# Patient Record
Sex: FEMALE | Race: WHITE | NOT HISPANIC OR LATINO | Employment: PART TIME | URBAN - METROPOLITAN AREA
[De-identification: names, ages, dates, MRNs, and addresses within clinical notes are randomized per-mention and may not be internally consistent; named-entity substitution may affect disease eponyms.]

---

## 2017-09-14 ENCOUNTER — OFFICE VISIT (OUTPATIENT)
Dept: FAMILY MEDICINE | Facility: CLINIC | Age: 42
End: 2017-09-14
Payer: COMMERCIAL

## 2017-09-14 VITALS
HEART RATE: 98 BPM | SYSTOLIC BLOOD PRESSURE: 117 MMHG | DIASTOLIC BLOOD PRESSURE: 68 MMHG | BODY MASS INDEX: 26.65 KG/M2 | OXYGEN SATURATION: 99 % | TEMPERATURE: 98.5 F | HEIGHT: 63 IN | WEIGHT: 150.4 LBS

## 2017-09-14 DIAGNOSIS — H66.002 ACUTE SUPPURATIVE OTITIS MEDIA OF LEFT EAR WITHOUT SPONTANEOUS RUPTURE OF TYMPANIC MEMBRANE, RECURRENCE NOT SPECIFIED: Primary | ICD-10-CM

## 2017-09-14 DIAGNOSIS — J32.9 VIRAL SINUSITIS: ICD-10-CM

## 2017-09-14 DIAGNOSIS — R19.7 DIARRHEA, UNSPECIFIED TYPE: ICD-10-CM

## 2017-09-14 DIAGNOSIS — B97.89 VIRAL SINUSITIS: ICD-10-CM

## 2017-09-14 PROCEDURE — 87506 IADNA-DNA/RNA PROBE TQ 6-11: CPT | Performed by: NURSE PRACTITIONER

## 2017-09-14 PROCEDURE — 87209 SMEAR COMPLEX STAIN: CPT | Performed by: NURSE PRACTITIONER

## 2017-09-14 PROCEDURE — 87177 OVA AND PARASITES SMEARS: CPT | Performed by: NURSE PRACTITIONER

## 2017-09-14 PROCEDURE — 87493 C DIFF AMPLIFIED PROBE: CPT | Mod: 59 | Performed by: NURSE PRACTITIONER

## 2017-09-14 PROCEDURE — 99214 OFFICE O/P EST MOD 30 MIN: CPT | Performed by: NURSE PRACTITIONER

## 2017-09-14 RX ORDER — FLUTICASONE PROPIONATE 50 MCG
1-2 SPRAY, SUSPENSION (ML) NASAL DAILY
Qty: 1 BOTTLE | Refills: 11 | Status: SHIPPED | OUTPATIENT
Start: 2017-09-14 | End: 2017-09-20

## 2017-09-14 RX ORDER — CEFDINIR 300 MG/1
300 CAPSULE ORAL 2 TIMES DAILY
Qty: 20 CAPSULE | Refills: 0 | Status: SHIPPED | OUTPATIENT
Start: 2017-09-14 | End: 2018-11-07

## 2017-09-14 NOTE — MR AVS SNAPSHOT
After Visit Summary   9/14/2017    Madeline Trent    MRN: [id number]           Patient Information     Date Of Birth          1975        Visit Information        Provider Department      9/14/2017 1:20 PM Merlyn Beckwith NP [facility]        Today's Diagnoses     Diarrhea, unspecified type    -  1    Acute suppurative otitis media of left ear without spontaneous rupture of tympanic membrane, recurrence not specified        Viral sinusitis          Care Instructions      Middle Ear Infection (Adult)  You have an infection of the middle ear, the space behind the eardrum. This is also called acute otitis media (AOM). Sometimes it is caused by the common cold. This is because congestion can block the internal passage (eustachian tube) that drains fluid from the middle ear. When the middle ear fills with fluid, bacteria can grow there and cause an infection. Oral antibiotics are used to treat this illness, not ear drops. Symptoms usually start to improve within 1 to 2 days of treatment.    Home care  The following are general care guidelines:    Finish all of the antibiotic medicine given, even though you may feel better after the first few days.    You may use over-the-counter medicine, such as acetaminophen or ibuprofen, to control pain and fever, unless something else was prescribed. If you have chronic liver or kidney disease or have ever had a stomach ulcer or gastrointestinal bleeding, talk with your healthcare provider before using these medicines. Do not give aspirin to anyone under 18 years of age who has a fever. It may cause severe illness or death.  Follow-up care  Follow up with your healthcare provider, or as advised, in 2 weeks if all symptoms have not gotten better, or if hearing doesn't go back to normal within 1 month.  When to seek medical advice  Call your healthcare provider right away if any of these occur:    Ear pain gets worse or does not improve after 3 days  of treatment    Unusual drowsiness or confusion    Neck pain, stiff neck, or headache    Fluid or blood draining from the ear canal    Fever of 100.4 F (38 C) or as advised     Seizure  Date Last Reviewed: 6/1/2016 2000-2017 The "[employer]. [address] [place] [address]. All rights reserved. This information is not intended as a substitute for professional medical care. Always follow your healthcare professional's instructions.                Follow-ups after your visit        Additional Services     GASTROENTEROLOGY ADULT REF PROCEDURE ONLY       Last Lab Result: Creatinine (mg/dL)       Date                     Value                 01/25/2013               0.75             ----------  Body mass index is 26.64 kg/(m^2).     Needed:  No  Language:  English    Patient will be contacted to schedule procedure.     Please be aware that coverage of these services is subject to the terms and limitations of your health insurance plan.  Call member services at your health plan with any benefit or coverage questions.  Any procedures must be performed at a [facility] facility OR coordinated by your clinic's referral office.    Please bring the following with you to your appointment:    (1) Any X-Rays, CTs or MRIs which have been performed.  Contact the facility where they were done to arrange for  prior to your scheduled appointment.    (2) List of current medications   (3) This referral request   (4) Any documents/labs given to you for this referral                  Follow-up notes from your care team     Return if symptoms worsen or fail to improve.      Future tests that were ordered for you today     Open Future Orders        Priority Expected Expires Ordered    Clostridium difficile Toxin B PCR Routine  10/14/2017 9/14/2017    Ova and Parasite Exam Routine Routine  9/14/2018 9/14/2017    Enteric Bacteria and Virus Panel by ANNETTE Stool Routine  9/14/2018 9/14/2017            Who to  "contact     Normal or non-critical lab and imaging results will be communicated to you by [facility], letter or phone within 4 business days after the clinic has received the results. If you do not hear from us within 7 days, please contact the clinic through THEMAt or phone. If you have a critical or abnormal lab result, we will notify you by phone as soon as possible.  Submit refill requests through [employer] or call your pharmacy and they will forward the refill request to us. Please allow 3 business days for your refill to be completed.          If you need to speak with a  for additional information , please call: [phone]             Additional Information About Your Visit        [employer] Information     [employer] gives you secure access to your electronic health record. If you see a primary care provider, you can also send messages to your care team and make appointments. If you have questions, please call your primary care clinic.  If you do not have a primary care provider, please call [phone] and they will assist you.        Care EveryWhere ID     This is your Care EveryWhere ID. This could be used by other organizations to access your [facility] medical records  [id number]        Your Vitals Were     Pulse Temperature Height Pulse Oximetry BMI (Body Mass Index)       98 98.5  F (36.9  C) (Oral) 5' 3\" (1.6 m) 99% 26.64 kg/m2        Blood Pressure from Last 3 Encounters:   09/14/17 117/68   04/28/16 110/73   01/07/16 119/57    Weight from Last 3 Encounters:   09/14/17 150 lb 6.4 oz (68.2 kg)   04/28/16 160 lb 3.2 oz (72.7 kg)   01/07/16 155 lb (70.3 kg)              We Performed the Following     Asthma Action Plan (AAP)     GASTROENTEROLOGY ADULT REF PROCEDURE ONLY          Today's Medication Changes          These changes are accurate as of: 9/14/17  1:36 PM.  If you have any questions, ask your nurse or doctor.               Start taking these medicines.        Dose/Directions    " cefdinir 300 MG capsule   Commonly known as:  OMNICEF   Used for:  Acute suppurative otitis media of left ear without spontaneous rupture of tympanic membrane, recurrence not specified   Started by:  Merlyn Beckwith NP        Dose:  300 mg   Take 1 capsule (300 mg) by mouth 2 times daily   Quantity:  20 capsule   Refills:  0       fluticasone 50 MCG/ACT spray   Commonly known as:  FLONASE   Used for:  Acute suppurative otitis media of left ear without spontaneous rupture of tympanic membrane, recurrence not specified, Viral sinusitis   Started by:  Merlyn Beckwith NP        Dose:  1-2 spray   Spray 1-2 sprays into both nostrils daily   Quantity:  1 Bottle   Refills:  11            Where to get your medicines      These medications were sent to [id number] - [place] - [address] SEC OF [address]  [address], [place] [address]    Hours:  Test fax successful 12/11/02  KR Phone:  [phone]     cefdinir 300 MG capsule    fluticasone 50 MCG/ACT spray                Primary Care Provider Office Phone # Fax #    González Mawuena Agbeh, [phone] [address]  [place] [address]        Equal Access to Services     [facility] : Hadii aad ku hadasho Soomaali, waaxda luqadaha, qaybta kaalmada adeegyada, waxay idiin hayaan adecassie brown . So [facility] [phone].    ATENCIÓN: Si habla español, tiene a murrieta disposición servicios gratuitos de asistencia lingüística. Llame al [phone].    We comply with applicable federal civil rights laws and [place] laws. We do not discriminate on the basis of race, color, national origin, age, disability sex, sexual orientation or gender identity.            Thank you!     Thank you for choosing [facility]  for your care. Our goal is always to provide you with excellent care. Hearing back from our patients is one way we can continue to improve our services. Please take a few  minutes to complete the written survey that you may receive in the mail after your visit with us. Thank you!             Your Updated Medication List - Protect others around you: Learn how to safely use, store and throw away your medicines at www.disposemymeds.org.          This list is accurate as of: 9/14/17  1:36 PM.  Always use your most recent med list.                   Brand Name Dispense Instructions for use Diagnosis    cefdinir 300 MG capsule    OMNICEF    20 capsule    Take 1 capsule (300 mg) by mouth 2 times daily    Acute suppurative otitis media of left ear without spontaneous rupture of tympanic membrane, recurrence not specified       fluticasone 50 MCG/ACT spray    FLONASE    1 Bottle    Spray 1-2 sprays into both nostrils daily    Acute suppurative otitis media of left ear without spontaneous rupture of tympanic membrane, recurrence not specified, Viral sinusitis

## 2017-09-14 NOTE — LETTER
My Asthma Action Plan  Name: Madeline Trent   YOB: 1975  Date: 9/14/2017   My doctor: Merlyn Beckwith NP   My clinic: [facility] [place]        My Control Medicine:   My Rescue Medicine:    My Asthma Severity:   Avoid your asthma triggers:                GREEN ZONE   Good Control    I feel good    No cough or wheeze    Can work, sleep and play without asthma symptoms       Take your asthma control medicine every day.     1. If exercise triggers your asthma, take your rescue medication    15 minutes before exercise or sports, and    During exercise if you have asthma symptoms  2. Spacer to use with inhaler: If you have a spacer, make sure to use it with your inhaler             YELLOW ZONE Getting Worse  I have ANY of these:    I do not feel good    Cough or wheeze    Chest feels tight    Wake up at night   1. Keep taking your Green Zone medications  2. Start taking your rescue medicine:    every 20 minutes for up to 1 hour. Then every 4 hours for 24-48 hours.  3. If you stay in the Yellow Zone for more than 12-24 hours, contact your doctor.  4. If you do not return to the Green Zone in 12-24 hours or you get worse, start taking your oral steroid medicine if prescribed by your provider.           RED ZONE Medical Alert - Get Help  I have ANY of these:    I feel awful    Medicine is not helping    Breathing getting harder    Trouble walking or talking    Nose opens wide to breathe       1. Take your rescue medicine NOW  2. If your provider has prescribed an oral steroid medicine, start taking it NOW  3. Call your doctor NOW  4. If you are still in the Red Zone after 20 minutes and you have not reached your doctor:    Take your rescue medicine again and    Call 911 or go to the emergency room right away    See your regular doctor within 2 weeks of an Emergency Room or Urgent Care visit for follow-up treatment.        Electronically signed by: Nancy Becerril, September 14, 2017    Annual  Reminders:  Meet with Asthma Educator,  Flu Shot in the Fall, consider Pneumonia Vaccination for patients with asthma (aged 19 and older).    Pharmacy: [facility] [address] [place] [address] [address]                    Asthma Triggers  How To Control Things That Make Your Asthma Worse    Triggers are things that make your asthma worse.  Look at the list below to help you find your triggers and what you can do about them.  You can help prevent asthma flare-ups by staying away from your triggers.      Trigger                                                          What you can do   Cigarette Smoke  Tobacco smoke can make asthma worse. Do not allow smoking in your home, car or around you.  Be sure no one smokes at a child s day care or school.  If you smoke, ask your health care provider for ways to help you quit.  Ask family members to quit too.  Ask your health care provider for a referral to Quit Plan to help you quit smoking, or call 9-047-365-PLAN.     Colds, Flu, Bronchitis  These are common triggers of asthma. Wash your hands often.  Don t touch your eyes, nose or mouth.  Get a flu shot every year.     Dust Mites  These are tiny bugs that live in cloth or carpet. They are too small to see. Wash sheets and blankets in hot water every week.   Encase pillows and mattress in dust mite proof covers.  Avoid having carpet if you can. If you have carpet, vacuum weekly.   Use a dust mask and HEPA vacuum.   Pollen and Outdoor Mold  Some people are allergic to trees, grass, or weed pollen, or molds. Try to keep your windows closed.  Limit time out doors when pollen count is high.   Ask you health care provider about taking medicine during allergy season.     Animal Dander  Some people are allergic to skin flakes, urine or saliva from pets with fur or feathers. Keep pets with fur or feathers out of your home.    If you can t keep the pet outdoors, then keep the pet out of your  bedroom.  Keep the bedroom door closed.  Keep pets off cloth furniture and away from stuffed toys.     Mice, Rats, and Cockroaches  Some people are allergic to the waste from these pests.   Cover food and garbage.  Clean up spills and food crumbs.  Store grease in the refrigerator.   Keep food out of the bedroom.   Indoor Mold  This can be a trigger if your home has high moisture. Fix leaking faucets, pipes, or other sources of water.   Clean moldy surfaces.  Dehumidify basement if it is damp and smelly.   Smoke, Strong Odors, and Sprays  These can reduce air quality. Stay away from strong odors and sprays, such as perfume, powder, hair spray, paints, smoke incense, paint, cleaning products, candles and new carpet.   Exercise or Sports  Some people with asthma have this trigger. Be active!  Ask your doctor about taking medicine before sports or exercise to prevent symptoms.    Warm up for 5-10 minutes before and after sports or exercise.     Other Triggers of Asthma  Cold air:  Cover your nose and mouth with a scarf.  Sometimes laughing or crying can be a trigger.  Some medicines and food can trigger asthma.

## 2017-09-14 NOTE — NURSING NOTE
"Chief Complaint   Patient presents with     Ent Problem     Diarrhea       Initial /68  Pulse 98  Temp 98.5  F (36.9  C) (Oral)  Ht 5' 3\" (1.6 m)  Wt 150 lb 6.4 oz (68.2 kg)  SpO2 99%  BMI 26.64 kg/m2 Estimated body mass index is 26.64 kg/(m^2) as calculated from the following:    Height as of this encounter: 5' 3\" (1.6 m).    Weight as of this encounter: 150 lb 6.4 oz (68.2 kg).  Medication Reconciliation: complete     Nancy Becerril MA  "

## 2017-09-14 NOTE — PROGRESS NOTES
SUBJECTIVE:  Madeline Trent  is a 42 year old  female  who presents with the following concerns;              Symptoms: Present Comment   Fever/Chills     Fatigue     Muscle Aches     Eye Irritation     Sneezing     Nasal Evelio/Drg x    Sinus Pressure/Pain x    Loss of smell     Dental pain     Sore Throat     Swollen Glands     Ear Pain/Fullness x Left    Cough     Wheeze     Chest Pain     Shortness of breath     Rash     Other x diarrhea     Symptom duration:  sinus-3rd day, diarrhea since may   Sympom severity:  mild   Treatments tried:  claritin   Contacts:  daughter had a cold       Medications updated and reviewed.  Past, family and surgical history is updated and reviewed in the record.  Patient Active Problem List    Diagnosis Date Noted     Mild persistent asthma 01/25/2013     Priority: High     ASCUS favor benign 11/30/2015     Priority: Medium     11/30/15: Pap - ASCUS, Neg HPV. Plan cotest in 3 years.        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     Genital herpes       Family History   Problem Relation Age of Onset     Lipids Father      Eye Disorder Maternal Grandfather      cataract     Lipids Maternal Grandfather      Respiratory Maternal Grandfather      CANCER Paternal Grandmother      pantriatic     Arthritis Paternal Grandmother      CANCER Paternal Grandfather      skin     Respiratory Paternal Grandfather      Arthritis Paternal Grandfather      Hypertension Brother      Alcohol/Drug Brother      addict to pain killers     CANCER Maternal Grandmother      ROS:  Other than noted above, general, HEENT, respiratory,  cardiac and gastrointestinal systems are negative.    OBJECTIVE:  GENERAL:  Alert, no acute distress  EYES:  PERRL, EOM normal, conjunctiva and lids normal  HEENT:  Ears and R TM normal, oral mucosa and posterior oropharynx normal  POSITIVE for L TM erythematous, bulging with loss of landmarks, nasal mucosa edematous, erythematous  RESP:  Lungs clear to auscultation.  CV:  Normal rate, regular rhythm, no murmur or gallop.  ABDOMEN:  Soft, no organomegaly, masses or tenderness  LYMPHATICS:  No cervical, supraclavicular adenopathy  NEURO:  Alert, oriented, speech and mentation normal      Assessment/Plan:     ICD-10-CM    1. Acute suppurative otitis media of left ear without spontaneous rupture of tympanic membrane, recurrence not specified H66.002 cefdinir (OMNICEF) 300 MG capsule     fluticasone (FLONASE) 50 MCG/ACT spray   2. Diarrhea, unspecified type R19.7 Clostridium difficile Toxin B PCR     Ova and Parasite Exam Routine     Enteric Bacteria and Virus Panel by ANNETTE Stool     GASTROENTEROLOGY ADULT REF PROCEDURE ONLY    x4 months   3. Viral sinusitis J32.9 fluticasone (FLONASE) 50 MCG/ACT spray    B97.89         See patient instructions    Merlyn Beckwith, MICHELL, FNP-BC

## 2017-09-14 NOTE — PATIENT INSTRUCTIONS
Middle Ear Infection (Adult)  You have an infection of the middle ear, the space behind the eardrum. This is also called acute otitis media (AOM). Sometimes it is caused by the common cold. This is because congestion can block the internal passage (eustachian tube) that drains fluid from the middle ear. When the middle ear fills with fluid, bacteria can grow there and cause an infection. Oral antibiotics are used to treat this illness, not ear drops. Symptoms usually start to improve within 1 to 2 days of treatment.    Home care  The following are general care guidelines:    Finish all of the antibiotic medicine given, even though you may feel better after the first few days.    You may use over-the-counter medicine, such as acetaminophen or ibuprofen, to control pain and fever, unless something else was prescribed. If you have chronic liver or kidney disease or have ever had a stomach ulcer or gastrointestinal bleeding, talk with your healthcare provider before using these medicines. Do not give aspirin to anyone under 18 years of age who has a fever. It may cause severe illness or death.  Follow-up care  Follow up with your healthcare provider, or as advised, in 2 weeks if all symptoms have not gotten better, or if hearing doesn't go back to normal within 1 month.  When to seek medical advice  Call your healthcare provider right away if any of these occur:    Ear pain gets worse or does not improve after 3 days of treatment    Unusual drowsiness or confusion    Neck pain, stiff neck, or headache    Fluid or blood draining from the ear canal    Fever of 100.4 F (38 C) or as advised     Seizure  Date Last Reviewed: 6/1/2016 2000-2017 [employer]. [address] [place] [address], [place] [address]. All rights reserved. This information is not intended as a substitute for professional medical care. Always follow your healthcare professional's instructions.

## 2017-09-15 ENCOUNTER — HOSPITAL ENCOUNTER (OUTPATIENT)
Facility: AMBULATORY SURGERY CENTER | Age: 42
End: 2017-09-15
Attending: SURGERY | Admitting: SURGERY
Payer: COMMERCIAL

## 2017-09-15 LAB
C COLI+JEJUNI+LARI FUSA STL QL NAA+PROBE: NOT DETECTED
C DIFF TOX B STL QL: NEGATIVE
EC STX1 GENE STL QL NAA+PROBE: NOT DETECTED
EC STX2 GENE STL QL NAA+PROBE: NOT DETECTED
ENTERIC PATHOGEN COMMENT: NORMAL
NOROV GI+II ORF1-ORF2 JNC STL QL NAA+PR: NOT DETECTED
O+P STL MICRO: NORMAL
O+P STL MICRO: NORMAL
RVA NSP5 STL QL NAA+PROBE: NOT DETECTED
SALMONELLA SP RPOD STL QL NAA+PROBE: NOT DETECTED
SHIGELLA SP+EIEC IPAH STL QL NAA+PROBE: NOT DETECTED
SPECIMEN SOURCE: NORMAL
SPECIMEN SOURCE: NORMAL
V CHOL+PARA RFBL+TRKH+TNAA STL QL NAA+PR: NOT DETECTED
Y ENTERO RECN STL QL NAA+PROBE: NOT DETECTED

## 2017-09-15 ASSESSMENT — ASTHMA QUESTIONNAIRES: ACT_TOTALSCORE: 24

## 2017-09-15 NOTE — PROGRESS NOTES
Julio Cesar Correa,    Thank you for your recent office visit.    Here are your recent results.  Normal stool tests. It is possible that you have IBS or another intestinal condition.  Please schedule your colonoscopy, I will let you know the results of that. Follow up if your symptoms worsen.     Feel free to contact me via [email or web address] or call the clinic at [phone].    Sincerely,    MICHELL Eaton, FNP-BC

## 2017-10-11 ENCOUNTER — HOSPITAL ENCOUNTER (OUTPATIENT)
Facility: AMBULATORY SURGERY CENTER | Age: 42
End: 2017-10-11
Attending: SURGERY | Admitting: SURGERY
Payer: COMMERCIAL

## 2017-10-30 RX ORDER — LIDOCAINE 40 MG/G
CREAM TOPICAL
Status: CANCELLED | OUTPATIENT
Start: 2017-10-30

## 2018-11-07 ENCOUNTER — OFFICE VISIT (OUTPATIENT)
Dept: OBGYN | Facility: CLINIC | Age: 43
End: 2018-11-07
Payer: COMMERCIAL

## 2018-11-07 ENCOUNTER — RESULT FOLLOW UP (OUTPATIENT)
Dept: OBGYN | Facility: CLINIC | Age: 43
End: 2018-11-07

## 2018-11-07 ENCOUNTER — TELEPHONE (OUTPATIENT)
Dept: FAMILY MEDICINE | Facility: CLINIC | Age: 43
End: 2018-11-07

## 2018-11-07 VITALS
HEART RATE: 78 BPM | DIASTOLIC BLOOD PRESSURE: 75 MMHG | OXYGEN SATURATION: 98 % | BODY MASS INDEX: 27.07 KG/M2 | WEIGHT: 152.8 LBS | HEIGHT: 63 IN | SYSTOLIC BLOOD PRESSURE: 119 MMHG | RESPIRATION RATE: 18 BRPM

## 2018-11-07 DIAGNOSIS — R19.7 DIARRHEA, UNSPECIFIED TYPE: Primary | ICD-10-CM

## 2018-11-07 DIAGNOSIS — Z12.31 ENCOUNTER FOR SCREENING MAMMOGRAM FOR BREAST CANCER: ICD-10-CM

## 2018-11-07 DIAGNOSIS — Z13.1 SCREENING FOR DIABETES MELLITUS: ICD-10-CM

## 2018-11-07 DIAGNOSIS — Z01.419 ENCOUNTER FOR GYNECOLOGICAL EXAMINATION WITHOUT ABNORMAL FINDING: Primary | ICD-10-CM

## 2018-11-07 DIAGNOSIS — Z13.220 SCREENING CHOLESTEROL LEVEL: ICD-10-CM

## 2018-11-07 DIAGNOSIS — R87.810 CERVICAL HIGH RISK HPV (HUMAN PAPILLOMAVIRUS) TEST POSITIVE: ICD-10-CM

## 2018-11-07 DIAGNOSIS — Z12.4 PAP SMEAR FOR CERVICAL CANCER SCREENING: ICD-10-CM

## 2018-11-07 PROCEDURE — 88175 CYTOPATH C/V AUTO FLUID REDO: CPT | Performed by: OBSTETRICS & GYNECOLOGY

## 2018-11-07 PROCEDURE — 87624 HPV HI-RISK TYP POOLED RSLT: CPT | Performed by: OBSTETRICS & GYNECOLOGY

## 2018-11-07 PROCEDURE — 99396 PREV VISIT EST AGE 40-64: CPT | Performed by: OBSTETRICS & GYNECOLOGY

## 2018-11-07 RX ORDER — ALBUTEROL SULFATE 90 UG/1
2 AEROSOL, METERED RESPIRATORY (INHALATION) EVERY 6 HOURS
COMMUNITY

## 2018-11-07 NOTE — PROGRESS NOTES
Madeline Trent is a 43 year old female , who presents for annual exam.   No unusual bleeding, no incontinence, or unusual discharge.   She is currently using the ParaGard IUD for contraception.  She does not have any apparent contraindications to use.  She has not had any apparent complications with it's use.  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  no evidence of malignancy.     PE TUBES         Obstetric History       T2      L2     SAB0   TAB0   Ectopic0   Multiple0   Live Births2       # Outcome Date GA Lbr Tyron/2nd Weight Sex Delivery Anes PTL Lv   2 Term 10/10/15   8 lb 7 oz (3.827 kg) F Vag-Spont   JEM      Name: Korin Purvis   1 Term 13 40w2d  7 lb 8 oz (3.402 kg) F    JEM      Name: Essence          Gyn History:  Gynecological History         No LMP recorded. Patient is not currently having periods (Reason: IUD).     no STD/no PID/ParaGard IUD      history of abnormal pap smear:  Yes   Last pap:   Lab Results   Component Value Date    PAP ASC-US 2015           Current Outpatient Prescriptions   Medication Sig Dispense Refill     albuterol (PROAIR HFA/PROVENTIL HFA/VENTOLIN HFA) 108 (90 Base) MCG/ACT inhaler Inhale 2 puffs into the lungs every 6 hours         Allergies   Allergen Reactions     Sulfa Drugs        Social History     Social History     Marital status: Significant other     Spouse name: partner- Sarabjit     Number of children: 1  "    Years of education: N/A     Occupational History     construction work  [employer]     Social History Main Topics     Smoking status: Never Smoker     Smokeless tobacco: Never Used     Alcohol use 0.0 oz/week     0 Standard drinks or equivalent per week      Comment: occasionally, not in PG     Drug use: No     Sexual activity: Yes     Partners: Male     Other Topics Concern      Service No     Blood Transfusions No     Caffeine Concern No     Occupational Exposure No     Hobby Hazards No     Sleep Concern No     Stress Concern No     Weight Concern No     Special Diet No     Back Care Yes     Exercise Yes     moderate, at work     Bike Helmet Not Asked     she does not ride a bike     Seat Belt Yes     Self-Exams Yes     Social History Narrative       Family History   Problem Relation Age of Onset     Lipids Father      Eye Disorder Maternal Grandfather      cataract     Lipids Maternal Grandfather      Respiratory Maternal Grandfather      Cancer Paternal Grandmother      pantriatic     Arthritis Paternal Grandmother      Cancer Paternal Grandfather      skin     Respiratory Paternal Grandfather      Arthritis Paternal Grandfather      Hypertension Brother      Alcohol/Drug Brother      addict to pain killers     Cancer Maternal Grandmother          ROS:  All negative except as above.      EXAM:  /75 (BP Location: Right arm, Cuff Size: Adult Regular)  Pulse 78  Resp 18  Ht 5' 2.5\" (1.588 m)  Wt 152 lb 12.8 oz (69.3 kg)  SpO2 98%  Breastfeeding? No  BMI 27.5 kg/m2  General:  WNWD female, NAD  Alert  Oriented x 3  Gait:  Normal  Skin:  Normal skin turgor  Neurologic:  CN grossly intact, good sensation.    HEENT:  NC/AT, EOMI  Neck:  No masses palpated, symmetrical, carotids +2/4, no bruits heard  Heart:  RRR  Lungs:  Clear   Breasts:  Symmetrical, no dimpling noted, no masses palpated, no discharge expressed  Abdomen:  Non-tender, non-distended.  Vulva: No external lesions, " normal hair distribution, no adenopathy  BUS:  Normal, no masses noted  Urethra:  No hypermobility noted  Urethral meatus:  No masses noted  Vagina: Moist, pink, no abnormal discharge, well rugated, no lesions  Cervix: Smooth, pink, no visible lesions  Uterus: Normal size, anteverted, non-tender, mobile  Ovaries: No mass, non-tender, mobile  Perianal:  No masses noted.    Extremities:  No clubbing, cyanosis, or edema      ASSESSMENT/PLAN   Annual examination with pap smear screen  Schedule for the mammogram   Low fat diet, weight bearing exercises and self breast exams on a monthly basis have been recommended.  I have discussed with patient the risks, benefits, medications, treatment options and modalities.   I have instructed the patient to call or schedule a follow-up appointment if any problems.

## 2018-11-07 NOTE — LETTER
October 24, 2019      Madeline Trent  [address] [place] [address]    Dear ,      At [place], your health and wellness is our primary concern. That is why we are following up on a positive high risk HPV test from 11/7/18. Your provider had recommended that you have a Pap smear and HPV test completed by 11/7/19. Our records do not show that this has been scheduled.    It is important to complete the follow up that your provider has suggested for you to ensure that there are no worsening changes which may, over time, develop into cancer.      Please contact our office at  [phone] to schedule an appointment for a Pap smear and HPV test at your earliest convenience. If you have questions or concerns, please call the clinic and we will be happy to assist you.    If you have completed the tests outside of [place], please have the results forwarded to our office. We will update the chart for your primary Physician to review before your next annual physical.     Thank you for choosing [facility]!    Sincerely,      Your [facility] Care Team/arun

## 2018-11-07 NOTE — TELEPHONE ENCOUNTER
Patient is calling to schedule a colonoscopy.  Please call patient to get this scheduled.  Thank you

## 2018-11-07 NOTE — MR AVS SNAPSHOT
After Visit Summary   11/7/2018    Madeline Trent    MRN: [id number]           Patient Information     Date Of Birth          1975        Visit Information        Provider Department      11/7/2018 2:00 PM Lion Cuevas MD [facility]        Today's Diagnoses     Encounter for gynecological examination without abnormal finding    -  1    Pap smear for cervical cancer screening        Encounter for screening mammogram for breast cancer        Screening cholesterol level        Screening for diabetes mellitus           Follow-ups after your visit        Follow-up notes from your care team     Return in about 1 year (around 11/7/2019) for Physical Exam.      Future tests that were ordered for you today     Open Future Orders        Priority Expected Expires Ordered    Lipid panel reflex to direct LDL Fasting Routine  11/7/2019 11/7/2018    Glucose Routine  5/6/2019 11/7/2018    *MA Screening Digital Bilateral Routine  11/7/2019 11/7/2018            Who to contact     If you have questions or need follow up information about today's clinic visit or your schedule please contact [facility] directly at [phone].  Normal or non-critical lab and imaging results will be communicated to you by MyChart, letter or phone within 4 business days after the clinic has received the results. If you do not hear from us within 7 days, please contact the clinic through [employer] or phone. If you have a critical or abnormal lab result, we will notify you by phone as soon as possible.  Submit refill requests through [employer] or call your pharmacy and they will forward the refill request to us. Please allow 3 business days for your refill to be completed.          Additional Information About Your Visit        MyChart Information     [employer] gives you secure access to your electronic health record. If you see a primary care provider, you can also send messages to your care team and  "make appointments. If you have questions, please call your primary care clinic.  If you do not have a primary care provider, please call [phone] and they will assist you.        Care EveryWhere ID     This is your Care EveryWhere ID. This could be used by other organizations to access your [place] medical records  [id number]        Your Vitals Were     Pulse Respirations Height Pulse Oximetry Breastfeeding? BMI (Body Mass Index)    78 18 5' 2.5\" (1.588 m) 98% No 27.5 kg/m2       Blood Pressure from Last 3 Encounters:   11/07/18 119/75   09/14/17 117/68   04/28/16 110/73    Weight from Last 3 Encounters:   11/07/18 152 lb 12.8 oz (69.3 kg)   09/14/17 150 lb 6.4 oz (68.2 kg)   04/28/16 160 lb 3.2 oz (72.7 kg)              We Performed the Following     HPV High Risk Types DNA Cervical     Pap imaged thin layer screen with HPV - recommended age 30 - 65 years (select HPV order below)        Primary Care Provider Office Phone # Fax #    González Mawuena Agbeh, [phone] [address] [place] [address]        Equal Access to Services     CHILANGO VALVERDE : Hadii aad ku hadasho Soomaali, waaxda luqadaha, qaybta kaalmada adeegyada, waxay zoie townsend. So [facility] [phone].    ATENCIÓN: Si habla español, tiene a murrieta disposición servicios gratuitos de asistencia lingüística. Llame al [phone].    We comply with applicable federal civil rights laws and [place] laws. We do not discriminate on the basis of race, color, national origin, age, disability, sex, sexual orientation, or gender identity.            Thank you!     Thank you for choosing [facility]  for your care. Our goal is always to provide you with excellent care. Hearing back from our patients is one way we can continue to improve our services. Please take a few minutes to complete the written survey that you may receive in the mail after your visit with us. Thank you!             Your " Updated Medication List - Protect others around you: Learn how to safely use, store and throw away your medicines at www.disposemymeds.org.          This list is accurate as of 11/7/18  3:05 PM.  Always use your most recent med list.                   Brand Name Dispense Instructions for use Diagnosis    albuterol 108 (90 Base) MCG/ACT inhaler    PROAIR HFA/PROVENTIL HFA/VENTOLIN HFA     Inhale 2 puffs into the lungs every 6 hours           Statement Selected

## 2018-11-08 NOTE — TELEPHONE ENCOUNTER
Routed to primary. MG has not seen patient in over a year and visit was for ent symptoms. Nancy Becerril MA

## 2018-11-12 ENCOUNTER — HOSPITAL ENCOUNTER (OUTPATIENT)
Facility: AMBULATORY SURGERY CENTER | Age: 43
End: 2018-11-12
Attending: SURGERY | Admitting: SURGERY
Payer: COMMERCIAL

## 2018-11-12 LAB
COPATH REPORT: NORMAL
PAP: NORMAL

## 2018-11-14 LAB
FINAL DIAGNOSIS: ABNORMAL
HPV HR 12 DNA CVX QL NAA+PROBE: POSITIVE
HPV16 DNA SPEC QL NAA+PROBE: NEGATIVE
HPV18 DNA SPEC QL NAA+PROBE: NEGATIVE
SPECIMEN DESCRIPTION: ABNORMAL
SPECIMEN SOURCE CVX/VAG CYTO: ABNORMAL

## 2018-11-14 NOTE — PROGRESS NOTES
11/30/15: Pap - ASCUS, Neg HPV. Plan cotest in 3 years.   11/7/18 NIL Pap, + HR HPV (Neg 16/18). Plan cotest in 1 year.   11/14/18 Result released to [employer]. Pt viewed result on [employer].   10/24/19 Cotest reminder letter sent (rl)  11/12/19 NIL Pap, + HR HPV (Neg 16/18). Plan colp (shasha)  11/19/19 Pt notified. (shasha)  12/6/19 colpo bx neg, ECC neg, polyp neg. Cotest in 1 year (e)

## 2018-11-27 RX ORDER — LIDOCAINE 40 MG/G
CREAM TOPICAL
Status: CANCELLED | OUTPATIENT
Start: 2018-11-27

## 2019-02-21 ENCOUNTER — ANESTHESIA EVENT (OUTPATIENT)
Dept: GASTROENTEROLOGY | Facility: CLINIC | Age: 44
End: 2019-02-21
Payer: COMMERCIAL

## 2019-02-22 NOTE — ANESTHESIA PREPROCEDURE EVALUATION
Anesthesia Pre-Procedure Evaluation    Patient: Madeline Trent   MRN: [id number] : 1975          Preoperative Diagnosis: diarrhea   diagnostic    Procedure(s):  COLONOSCOPY    Past Medical History:   Diagnosis Date     Abnormal Pap smear of cervix 2015    see problem list     Asthma      Benign tumor of fallopian tube and uterine ligaments      Cervical high risk HPV (human papillomavirus) test positive 2018    see problem list     Genital herpes      Past Surgical History:   Procedure Laterality Date     ADENOIDECTOMY       COLPOSCOPY, BIOPSY, COMBINED       HC EXCIS PRIMARY GANGLION WRIST       HC REMOVAL OF OVARIAN CYST(S)      right adnexal cyst:  benign focally papillary serous cystadenoma.  no evidence of malignancy.     PE TUBES         Anesthesia Evaluation     . Pt has had prior anesthetic. Type: General and MAC    No history of anesthetic complications          ROS/MED HX    ENT/Pulmonary:     (+)Mild Persistent asthma Treatment: Inhaler daily and Inhaler prn,  , . .    Neurologic:  - neg neurologic ROS     Cardiovascular:     (+) Dyslipidemia, ----. : . . . :. .       METS/Exercise Tolerance:  >4 METS   Hematologic:  - neg hematologic  ROS       Musculoskeletal:  - neg musculoskeletal ROS       GI/Hepatic:  - neg GI/hepatic ROS       Renal/Genitourinary:  - ROS Renal section negative       Endo:  - neg endo ROS       Psychiatric:  - neg psychiatric ROS       Infectious Disease:  - neg infectious disease ROS       Malignancy:      - no malignancy   Other: Comment: Abnormal pap  Genital herpes  High risk HPV   (+) No chance of pregnancy                         Physical Exam  Normal systems: cardiovascular, pulmonary and dental    Airway   Mallampati: II  TM distance: >3 FB  Neck ROM: full    Dental     Cardiovascular   Rhythm and rate: regular and normal      Pulmonary    breath sounds clear to auscultation            Lab Results   Component Value Date    WBC 7.8 2015     "HGB 12.0 09/02/2015    HCT 36.9 03/31/2015     03/31/2015    CRP <5.0 02/08/2013    SED 7 02/08/2013     01/25/2013    POTASSIUM 4.5 01/25/2013    CHLORIDE 101 01/25/2013    CO2 25 01/25/2013    BUN 14 01/25/2013    CR 0.75 01/25/2013    GLC 80 04/29/2016    NYA 9.5 01/25/2013    ALBUMIN 4.0 01/25/2013    PROTTOTAL 6.9 01/25/2013    ALT 27 01/25/2013    AST 27 01/25/2013    ALKPHOS 65 01/25/2013    BILITOTAL 0.5 01/25/2013    TSH 2.01 01/28/2013    HCG Positive (A) 04/04/2013       Preop Vitals  BP Readings from Last 3 Encounters:   11/07/18 119/75   09/14/17 117/68   04/28/16 110/73    Pulse Readings from Last 3 Encounters:   11/07/18 78   09/14/17 98   04/28/16 90      Resp Readings from Last 3 Encounters:   11/07/18 18   11/30/15 16   07/19/13 16    SpO2 Readings from Last 3 Encounters:   11/07/18 98%   09/14/17 99%   11/30/15 96%      Temp Readings from Last 1 Encounters:   09/14/17 36.9  C (98.5  F) (Oral)    Ht Readings from Last 1 Encounters:   11/07/18 1.588 m (5' 2.5\")      Wt Readings from Last 1 Encounters:   11/07/18 69.3 kg (152 lb 12.8 oz)    Estimated body mass index is 27.5 kg/m  as calculated from the following:    Height as of 11/7/18: 1.588 m (5' 2.5\").    Weight as of 11/7/18: 69.3 kg (152 lb 12.8 oz).       Anesthesia Plan      History & Physical Review  History and physical reviewed and following examination; no interval change.    ASA Status:  2 .        Plan for General with Propofol induction. Maintenance will be TIVA.           Postoperative Care      Consents  Anesthetic plan, risks, benefits and alternatives discussed with:  Patient..                 MICHELL Beltran CRNA  "

## 2019-02-27 ENCOUNTER — HOSPITAL ENCOUNTER (OUTPATIENT)
Facility: CLINIC | Age: 44
Discharge: HOME | End: 2019-02-27
Attending: SURGERY | Admitting: SURGERY
Payer: COMMERCIAL

## 2019-02-27 ENCOUNTER — ANESTHESIA (OUTPATIENT)
Dept: GASTROENTEROLOGY | Facility: CLINIC | Age: 44
End: 2019-02-27
Payer: COMMERCIAL

## 2019-02-27 VITALS
HEIGHT: 64 IN | HEART RATE: 65 BPM | SYSTOLIC BLOOD PRESSURE: 111 MMHG | DIASTOLIC BLOOD PRESSURE: 77 MMHG | OXYGEN SATURATION: 100 % | RESPIRATION RATE: 14 BRPM | TEMPERATURE: 97.7 F | WEIGHT: 145 LBS | BODY MASS INDEX: 24.75 KG/M2

## 2019-02-27 LAB
COLONOSCOPY: NORMAL
HCG UR QL: NEGATIVE

## 2019-02-27 PROCEDURE — 45380 COLONOSCOPY AND BIOPSY: CPT | Performed by: SURGERY

## 2019-02-27 PROCEDURE — 25000125 ZZHC RX 250: Performed by: SURGERY

## 2019-02-27 PROCEDURE — 81025 URINE PREGNANCY TEST: CPT | Performed by: NURSE ANESTHETIST, CERTIFIED REGISTERED

## 2019-02-27 PROCEDURE — 37000008 ZZH ANESTHESIA TECHNICAL FEE, 1ST 30 MIN: Performed by: SURGERY

## 2019-02-27 PROCEDURE — 25800030 ZZH RX IP 258 OP 636: Performed by: SURGERY

## 2019-02-27 PROCEDURE — 25000128 H RX IP 250 OP 636: Performed by: NURSE ANESTHETIST, CERTIFIED REGISTERED

## 2019-02-27 PROCEDURE — 25000125 ZZHC RX 250: Performed by: NURSE ANESTHETIST, CERTIFIED REGISTERED

## 2019-02-27 PROCEDURE — 88305 TISSUE EXAM BY PATHOLOGIST: CPT | Performed by: SURGERY

## 2019-02-27 PROCEDURE — 88305 TISSUE EXAM BY PATHOLOGIST: CPT | Mod: 26 | Performed by: SURGERY

## 2019-02-27 RX ORDER — ONDANSETRON 2 MG/ML
4 INJECTION INTRAMUSCULAR; INTRAVENOUS
Status: DISCONTINUED | OUTPATIENT
Start: 2019-02-27 | End: 2019-02-27 | Stop reason: HOSPADM

## 2019-02-27 RX ORDER — LIDOCAINE 40 MG/G
CREAM TOPICAL
Status: DISCONTINUED | OUTPATIENT
Start: 2019-02-27 | End: 2019-02-27 | Stop reason: HOSPADM

## 2019-02-27 RX ORDER — SODIUM CHLORIDE, SODIUM LACTATE, POTASSIUM CHLORIDE, CALCIUM CHLORIDE 600; 310; 30; 20 MG/100ML; MG/100ML; MG/100ML; MG/100ML
INJECTION, SOLUTION INTRAVENOUS CONTINUOUS
Status: DISCONTINUED | OUTPATIENT
Start: 2019-02-27 | End: 2019-02-27 | Stop reason: HOSPADM

## 2019-02-27 RX ORDER — NALOXONE HYDROCHLORIDE 0.4 MG/ML
.1-.4 INJECTION, SOLUTION INTRAMUSCULAR; INTRAVENOUS; SUBCUTANEOUS
Status: DISCONTINUED | OUTPATIENT
Start: 2019-02-27 | End: 2019-02-27 | Stop reason: HOSPADM

## 2019-02-27 RX ORDER — PROPOFOL 10 MG/ML
INJECTION, EMULSION INTRAVENOUS PRN
Status: DISCONTINUED | OUTPATIENT
Start: 2019-02-27 | End: 2019-02-27

## 2019-02-27 RX ORDER — FLUMAZENIL 0.1 MG/ML
0.2 INJECTION, SOLUTION INTRAVENOUS
Status: DISCONTINUED | OUTPATIENT
Start: 2019-02-27 | End: 2019-02-27 | Stop reason: HOSPADM

## 2019-02-27 RX ORDER — PROPOFOL 10 MG/ML
INJECTION, EMULSION INTRAVENOUS CONTINUOUS PRN
Status: DISCONTINUED | OUTPATIENT
Start: 2019-02-27 | End: 2019-02-27

## 2019-02-27 RX ADMIN — PROPOFOL 200 MCG/KG/MIN: 10 INJECTION, EMULSION INTRAVENOUS at 10:24

## 2019-02-27 RX ADMIN — LIDOCAINE HYDROCHLORIDE 40 MG: 10 INJECTION, SOLUTION EPIDURAL; INFILTRATION; INTRACAUDAL; PERINEURAL at 10:24

## 2019-02-27 RX ADMIN — LIDOCAINE HYDROCHLORIDE 0.1 ML: 10 INJECTION, SOLUTION EPIDURAL; INFILTRATION; INTRACAUDAL; PERINEURAL at 10:14

## 2019-02-27 RX ADMIN — PROPOFOL 100 MG: 10 INJECTION, EMULSION INTRAVENOUS at 10:24

## 2019-02-27 RX ADMIN — SODIUM CHLORIDE, POTASSIUM CHLORIDE, SODIUM LACTATE AND CALCIUM CHLORIDE: 600; 310; 30; 20 INJECTION, SOLUTION INTRAVENOUS at 10:14

## 2019-02-27 ASSESSMENT — MIFFLIN-ST. JEOR: SCORE: 1302.72

## 2019-02-27 NOTE — H&P
44 year old year old female here for colonoscopy for abdominal pain and chronic diarrhea.  Patient has had ongoing GI symptoms for years.  Describes spastic rectal pain which is random in frequency and duration.  She also admits chronic diarrhea, she was told she has IBS long ago, these symptoms have improved.  Denies blood in her stool.  There is a family history of polyps in her father and questionable CRC in her great grandmother.    Patient Active Problem List   Diagnosis     CARDIOVASCULAR SCREENING; LDL GOAL LESS THAN 160     Mild persistent asthma     RhD negative     Positive test for herpes simplex virus (HSV) antibody     Cervical high risk HPV (human papillomavirus) test positive       Past Medical History:   Diagnosis Date     Abnormal Pap smear of cervix 11/2015    see problem list     Asthma      Benign tumor of fallopian tube and uterine ligaments 2009     Cervical high risk HPV (human papillomavirus) test positive 11/07/2018    see problem list     Genital herpes        Past Surgical History:   Procedure Laterality Date     ADENOIDECTOMY       COLPOSCOPY, BIOPSY, COMBINED       HC EXCIS PRIMARY GANGLION WRIST       HC REMOVAL OF OVARIAN CYST(S)  2009    right adnexal cyst:  benign focally papillary serous cystadenoma.  no evidence of malignancy.     PE TUBES         Family History   Problem Relation Age of Onset     Lipids Father      Eye Disorder Maternal Grandfather         cataract     Lipids Maternal Grandfather      Respiratory Maternal Grandfather      Cancer Paternal Grandmother         pantriatic     Arthritis Paternal Grandmother      Cancer Paternal Grandfather         skin     Respiratory Paternal Grandfather      Arthritis Paternal Grandfather      Hypertension Brother      Alcohol/Drug Brother         addict to pain killers     Cancer Maternal Grandmother      No current outpatient medications on file.       Allergies   Allergen Reactions     Sulfa Drugs        Pt reports that  has never  "smoked. she has never used smokeless tobacco. She reports that she drinks alcohol. She reports that she does not use drugs.    Exam:  /74   Temp 97.7  F (36.5  C) (Oral)   Resp 16   Ht 1.626 m (5' 4\")   Wt 65.8 kg (145 lb)   SpO2 100%   Breastfeeding? No   BMI 24.89 kg/m      Awake, Alert OX3  Lungs - CTA bilaterally  CV - RRR, no murmurs, distal pulses intact  Abd - soft, non-distended, non-tender, +BS  Extr - No cyanosis or edema    A/P 44 year old year old female in need of colonoscopy for abdominal pain and diarrhea. Risks, benefits, alternatives, and complications were discussed including the possibility of perforation and the patient agreed to proceed.    Narinder Parker, DO on 2/27/2019 at 10:21 AM      "

## 2019-02-27 NOTE — ANESTHESIA POSTPROCEDURE EVALUATION
Patient: Madeline Trent    Procedure(s):  COMBINED COLONOSCOPY, SINGLE OR MULTIPLE BIOPSY/POLYPECTOMY BY BIOPSY    Diagnosis:diarrhea   diagnostic  Diagnosis Additional Information: No value filed.    Anesthesia Type:  General    Note:  Anesthesia Post Evaluation    Patient location during evaluation: Bedside and Phase 2  Patient participation: Able to fully participate in evaluation  Level of consciousness: awake and alert  Pain management: adequate  Airway patency: patent  Cardiovascular status: acceptable  Respiratory status: acceptable  Hydration status: acceptable  PONV: none     Anesthetic complications: None          Last vitals:  Vitals:    02/27/19 0942   BP: 118/74   Resp: 16   Temp: 36.5  C (97.7  F)   SpO2: 100%         Electronically Signed By: Daron Montoya CRNA, APRN CRNA  February 27, 2019  10:44 AM

## 2019-02-27 NOTE — BRIEF OP NOTE
[facility]    Brief Operative Note    Pre-operative diagnosis: diarrhea and abdominal pain  diagnostic  Post-operative diagnosis 1. Cecal mucosal erythema  Procedure: Procedure(s):  COMBINED COLONOSCOPY, SINGLE OR MULTIPLE BIOPSY/POLYPECTOMY BY BIOPSY  Surgeon: Surgeon(s) and Role:     * Narinder Parker DO - Primary  Anesthesia: Monitor Anesthesia Care   Estimated blood loss: Minimal  Drains: None  Specimens:   ID Type Source Tests Collected by Time Destination   A : Ceceal Biopsy Tissue Large Intestine, Cecum SURGICAL PATHOLOGY EXAM Narinder Parker,  2/27/2019 10:36 AM    B : Random Colon Tissue Colon SURGICAL PATHOLOGY EXAM Narinder Parker DO 2/27/2019 10:41 AM      Findings:   None.  Complications: None.  Implants: None.

## 2019-03-01 LAB — COPATH REPORT: NORMAL

## 2019-11-12 ENCOUNTER — OFFICE VISIT (OUTPATIENT)
Dept: OBGYN | Facility: CLINIC | Age: 44
End: 2019-11-12
Payer: COMMERCIAL

## 2019-11-12 VITALS
WEIGHT: 147.2 LBS | OXYGEN SATURATION: 98 % | BODY MASS INDEX: 26.08 KG/M2 | HEIGHT: 63 IN | DIASTOLIC BLOOD PRESSURE: 71 MMHG | SYSTOLIC BLOOD PRESSURE: 107 MMHG | HEART RATE: 74 BPM

## 2019-11-12 DIAGNOSIS — Z13.220 SCREENING CHOLESTEROL LEVEL: ICD-10-CM

## 2019-11-12 DIAGNOSIS — Z13.1 SCREENING FOR DIABETES MELLITUS: ICD-10-CM

## 2019-11-12 DIAGNOSIS — N89.8 VAGINAL DISCHARGE: ICD-10-CM

## 2019-11-12 DIAGNOSIS — R87.810 CERVICAL HIGH RISK HPV (HUMAN PAPILLOMAVIRUS) TEST POSITIVE: ICD-10-CM

## 2019-11-12 DIAGNOSIS — Z01.411 ENCOUNTER FOR GYNECOLOGICAL EXAMINATION WITH ABNORMAL FINDING: Primary | ICD-10-CM

## 2019-11-12 LAB
CHOLEST SERPL-MCNC: 237 MG/DL
GLUCOSE SERPL-MCNC: 91 MG/DL (ref 70–99)
HDLC SERPL-MCNC: 74 MG/DL
LDLC SERPL CALC-MCNC: 151 MG/DL
NONHDLC SERPL-MCNC: 163 MG/DL
SPECIMEN SOURCE: NORMAL
TRIGL SERPL-MCNC: 61 MG/DL
WET PREP SPEC: NORMAL

## 2019-11-12 PROCEDURE — 87624 HPV HI-RISK TYP POOLED RSLT: CPT | Performed by: OBSTETRICS & GYNECOLOGY

## 2019-11-12 PROCEDURE — 99396 PREV VISIT EST AGE 40-64: CPT | Performed by: OBSTETRICS & GYNECOLOGY

## 2019-11-12 PROCEDURE — 87210 SMEAR WET MOUNT SALINE/INK: CPT | Performed by: OBSTETRICS & GYNECOLOGY

## 2019-11-12 PROCEDURE — 36415 COLL VENOUS BLD VENIPUNCTURE: CPT | Performed by: OBSTETRICS & GYNECOLOGY

## 2019-11-12 PROCEDURE — 82947 ASSAY GLUCOSE BLOOD QUANT: CPT | Performed by: OBSTETRICS & GYNECOLOGY

## 2019-11-12 PROCEDURE — 80061 LIPID PANEL: CPT | Performed by: OBSTETRICS & GYNECOLOGY

## 2019-11-12 PROCEDURE — 88175 CYTOPATH C/V AUTO FLUID REDO: CPT | Performed by: OBSTETRICS & GYNECOLOGY

## 2019-11-12 RX ORDER — COPPER 313.4 MG/1
INTRAUTERINE DEVICE INTRAUTERINE
Qty: 1 EACH | Refills: 0 | COMMUNITY
Start: 2015-11-30 | End: 2021-04-07

## 2019-11-12 ASSESSMENT — MIFFLIN-ST. JEOR: SCORE: 1295.23

## 2019-11-12 NOTE — PROGRESS NOTES
Madeline Trent is a 44 year old female , who presents for annual exam.   No unusual bleeding, no incontinence, or unusual discharge.   She has had some discharge and some odor, but she is not sure about the smell.  She states that if the partner uses saliva for lubrication with sex, she has problems with bacterial infections.    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  no evidence of malignancy.     PE TUBES         OB History    Para Term  AB Living   2 2 2 0 0 2   SAB TAB Ectopic Multiple Live Births   0 0 0 0 2      # Outcome Date GA Lbr Tyron/2nd Weight Sex Delivery Anes PTL Lv   2 Term 10/10/15   3.827 kg (8 lb 7 oz) F Vag-Spont   JEM      Name: Korin Purvis   1 Term 13 40w2d  3.402 kg (7 lb 8 oz) F    JEM      Name: Essence       Gyn History:  Gynecological History         Patient's last menstrual period was 10/27/2019 (exact date).     STD HPV/no PID/She has the ParaGard IUD      history of abnormal pap smear:  yes  Last pap:   Lab Results   Component Value Date    PAP NIL 2018           Current Outpatient Medications   Medication Sig Dispense Refill     albuterol (PROAIR HFA/PROVENTIL HFA/VENTOLIN HFA)  108 (90 Base) MCG/ACT inhaler Inhale 2 puffs into the lungs every 6 hours       paragard intrauterine copper device One device, intrauterine for up to 10 years 1 each 0       Allergies   Allergen Reactions     Sulfa Drugs        Social History     Socioeconomic History     Marital status: Significant other     Spouse name: partner- Sarabjit     Number of children: 1     Years of education: Not on file     Highest education level: Not on file   Occupational History     Occupation: construction work      Employer: [employer]   Social Needs     Financial resource strain: Not on file     Food insecurity:     Worry: Not on file     Inability: Not on file     Transportation needs:     Medical: Not on file     Non-medical: Not on file   Tobacco Use     Smoking status: Never Smoker     Smokeless tobacco: Never Used   Substance and Sexual Activity     Alcohol use: Yes     Alcohol/week: 0.0 standard drinks     Comment: occasionally, not in PG     Drug use: No     Sexual activity: Yes     Partners: Male   Lifestyle     Physical activity:     Days per week: Not on file     Minutes per session: Not on file     Stress: Not on file   Relationships     Social connections:     Talks on phone: Not on file     Gets together: Not on file     Attends Religion service: Not on file     Active member of club or organization: Not on file     Attends meetings of clubs or organizations: Not on file     Relationship status: Not on file     Intimate partner violence:     Fear of current or ex partner: Not on file     Emotionally abused: Not on file     Physically abused: Not on file     Forced sexual activity: Not on file   Other Topics Concern     Parent/sibling w/ CABG, MI or angioplasty before 65F 55M? Not Asked      Service No     Blood Transfusions No     Caffeine Concern No     Occupational Exposure No     Hobby Hazards No     Sleep Concern No     Stress Concern No     Weight Concern No     Special Diet No      "Back Care Yes     Exercise Yes     Comment: moderate, at work     Bike Helmet Not Asked     Comment: she does not ride a bike     Seat Belt Yes     Self-Exams Yes   Social History Narrative     Not on file       Family History   Problem Relation Age of Onset     Lipids Father      Eye Disorder Maternal Grandfather         cataract     Lipids Maternal Grandfather      Respiratory Maternal Grandfather      Cancer Paternal Grandmother         pantriatic     Arthritis Paternal Grandmother      Cancer Paternal Grandfather         skin     Respiratory Paternal Grandfather      Arthritis Paternal Grandfather      Hypertension Brother      Alcohol/Drug Brother         addict to pain killers     Cancer Maternal Grandmother          ROS:  All negative except as above.      EXAM:  /71 (BP Location: Right arm, Cuff Size: Adult Regular)   Pulse 74   Ht 1.598 m (5' 2.9\")   Wt 66.8 kg (147 lb 3.2 oz)   LMP 10/27/2019 (Exact Date)   SpO2 98%   BMI 26.16 kg/m    General:  WNWD female, NAD  Alert  Oriented x 3  Gait:  Normal  Skin:  Normal skin turgor  Neurologic:  CN grossly intact, good sensation.    HEENT:  NC/AT, EOMI  Neck:  No masses palpated, symmetrical, carotids +2/4, no bruits heard  Heart:  RRR  Lungs:  Clear   Breasts:  Symmetrical, no dimpling noted, no masses palpated, no discharge expressed  Abdomen:  Non-tender, non-distended.  Vulva: No external lesions, normal hair distribution, no adenopathy  BUS:  Normal, no masses noted  Urethra:  No hypermobility noted  Urethral meatus:  No masses noted  Vagina: Moist, pink, no abnormal discharge, well rugated, no lesions  Cervix: Smooth, pink, no visible lesions, IUD strings seen   Uterus: Normal size, anteverted, non-tender, mobile  Ovaries: No mass, non-tender, mobile  Perianal:  No masses noted.    Extremities:  No clubbing, cyanosis, or edema      ASSESSMENT/PLAN   Annual examination   High risk HPV of cervix.  Repeat pap smear today   nonfasting labs ordered " today   Low fat diet, weight bearing exercises and self breast exams on a monthly basis have been recommended.  I have discussed with patient the risks, benefits, medications, treatment options and modalities.   I have instructed the patient to call or schedule a follow-up appointment if any problems.

## 2019-11-15 LAB
COPATH REPORT: NORMAL
PAP: NORMAL

## 2019-12-06 ENCOUNTER — OFFICE VISIT (OUTPATIENT)
Dept: OBGYN | Facility: CLINIC | Age: 44
End: 2019-12-06
Payer: COMMERCIAL

## 2019-12-06 VITALS
BODY MASS INDEX: 26.3 KG/M2 | OXYGEN SATURATION: 99 % | WEIGHT: 148 LBS | DIASTOLIC BLOOD PRESSURE: 85 MMHG | SYSTOLIC BLOOD PRESSURE: 124 MMHG | HEART RATE: 74 BPM

## 2019-12-06 DIAGNOSIS — N84.1 CERVICAL POLYP: ICD-10-CM

## 2019-12-06 DIAGNOSIS — R87.810 CERVICAL HIGH RISK HPV (HUMAN PAPILLOMAVIRUS) TEST POSITIVE: Primary | ICD-10-CM

## 2019-12-06 DIAGNOSIS — N89.8 VAGINAL DISCHARGE: ICD-10-CM

## 2019-12-06 LAB
SPECIMEN SOURCE: NORMAL
WET PREP SPEC: NORMAL

## 2019-12-06 PROCEDURE — 88341 IMHCHEM/IMCYTCHM EA ADD ANTB: CPT | Performed by: OBSTETRICS & GYNECOLOGY

## 2019-12-06 PROCEDURE — 57454 BX/CURETT OF CERVIX W/SCOPE: CPT | Performed by: OBSTETRICS & GYNECOLOGY

## 2019-12-06 PROCEDURE — 88305 TISSUE EXAM BY PATHOLOGIST: CPT | Performed by: OBSTETRICS & GYNECOLOGY

## 2019-12-06 PROCEDURE — 88342 IMHCHEM/IMCYTCHM 1ST ANTB: CPT | Performed by: OBSTETRICS & GYNECOLOGY

## 2019-12-06 PROCEDURE — 87210 SMEAR WET MOUNT SALINE/INK: CPT | Performed by: OBSTETRICS & GYNECOLOGY

## 2019-12-06 PROCEDURE — 99214 OFFICE O/P EST MOD 30 MIN: CPT | Mod: 25 | Performed by: OBSTETRICS & GYNECOLOGY

## 2019-12-06 NOTE — PROGRESS NOTES
"Patient Name: Madeline Trent              Date: 12/6/2019   YOB: 1975                         Age: 44 year old   Phone: [phone] (home)   ________________________________________________________________________  Madeline presents today  to discuss the pap smear, findings and possible further evaluation.  The patient's pap smear history is as noted:  11/30/15: Pap - ASCUS, Neg HPV. Plan cotest in 3 years.   11/7/18 NIL Pap, + HR HPV (Neg 16/18). Plan cotest in 1 year.   11/12/19 NIL Pap, + HR HPV (Neg 16/18).   I attempted to ensure that the patient was educated regarding the nature of her findings and implications to date.  We reviewed the role of HPV, incidence in the population and the natural history of the infection, and its transmission.  We also reviewed ways to minimize her future risk, the effect of HPV on the cervix and treatment options available, should they be indicated.    The pathophysiology of the cervix, including a discussion of the squamous and columnar cells, metaplasia and dysplasia have been reviewed, drawings, sketches and the pamphlets were reviewed with her.      No LMP recorded. (Menstrual status: IUD).  Current Birth Control Method: IUD Paragard  History of veneral diseases: HPV, HSV  History of genital warts:  No  Visible warts now?:  No  Family History of  Cervical, Uterine or Vaginal Cancer?: No    She reports that she has had vaginal discharge and \"an odor\" that she is not able to define.  She has had this since she started having sex with her past partner.  The partner uses saliva for lubrication with sex, she has problems with bacterial infections.  She had this evaluated 3 weeks ago, and the wet prep results were negative.     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  no evidence of malignancy.     PE TUBES          Outpatient Encounter Medications as of 12/6/2019   Medication Sig Dispense Refill     paragard intrauterine copper device One device, intrauterine for up to 10 years 1 each 0     albuterol (PROAIR HFA/PROVENTIL HFA/VENTOLIN HFA) 108 (90 Base) MCG/ACT inhaler Inhale 2 puffs into the lungs every 6 hours       No facility-administered encounter medications on file as of 12/6/2019.         Allergies as of 12/06/2019 - Reviewed 12/06/2019   Allergen Reaction Noted     Sulfa drugs  08/12/2009       Social History     Socioeconomic History     Marital status: Significant other     Spouse name: partner- Sarabjit     Number of children: 1     Years of education: Not on file     Highest education level: Not on file   Occupational History     Occupation: construction work      Employer: [employer]   Social Needs     Financial resource strain: Not on file     Food insecurity:     Worry: Not on file     Inability: Not on file     Transportation needs:     Medical: Not on file     Non-medical: Not on file   Tobacco Use     Smoking status: Never Smoker     Smokeless tobacco: Never Used   Substance and Sexual Activity     Alcohol use: Yes     Alcohol/week: 0.0 standard drinks     Comment: occasionally, not in PG     Drug use: No     Sexual activity: Yes     Partners: Male   Lifestyle     Physical activity:     Days per week: Not on file     Minutes per session: Not on file     Stress: Not  on file   Relationships     Social connections:     Talks on phone: Not on file     Gets together: Not on file     Attends Buddhism service: Not on file     Active member of club or organization: Not on file     Attends meetings of clubs or organizations: Not on file     Relationship status: Not on file     Intimate partner violence:     Fear of current or ex partner: Not on file     Emotionally abused: Not on file     Physically abused: Not on file     Forced sexual activity: Not on file   Other Topics Concern     Parent/sibling w/ CABG, MI or angioplasty before 65F 55M? Not Asked      Service No     Blood Transfusions No     Caffeine Concern No     Occupational Exposure No     Hobby Hazards No     Sleep Concern No     Stress Concern No     Weight Concern No     Special Diet No     Back Care Yes     Exercise Yes     Comment: moderate, at work     Bike Helmet Not Asked     Comment: she does not ride a bike     Seat Belt Yes     Self-Exams Yes   Social History Narrative     Not on file        Family History   Problem Relation Age of Onset     Lipids Father      Eye Disorder Maternal Grandfather         cataract     Lipids Maternal Grandfather      Respiratory Maternal Grandfather      Cancer Paternal Grandmother         pantriatic     Arthritis Paternal Grandmother      Cancer Paternal Grandfather         skin     Respiratory Paternal Grandfather      Arthritis Paternal Grandfather      Hypertension Brother      Alcohol/Drug Brother         addict to pain killers     Cancer Maternal Grandmother          Review Of Systems  10 point ROS of systems including Constitutional, Eyes, Respiratory, Cardiovascular, Gastroenterology, Genitourinary, Integumentary, Muscularskeletal, Psychiatric were all negative except for pertinent positives noted in my HPI and in the PMH.      Exam:   There were no vitals taken for this visit.  GENERAL:  WNWD female NAD  HEENT: NC/AT, EOMI  Lungs:  Good respiratory effort   SKIN:  normal skin turgor  GAIT: Normal  NECK: Symmetrical, no masses noted   VULVA: Normal Genitalia  BUS: Normal  URETHRA:  No hypermobility noted  URETHRAL MEATUS:  No masses noted  VAGINA: Normal mucosa, no discharge  CERVIX: Closed, mobile, no discharge, small polypoid tissue seen at cervical os.  IUD strings seen.   PERIANAL:  No masses or lesions seen  EXTREMITIES: no clubbing, cyanosis, or edema    Assessment:  High risk HPV of cervix   Cervical polyp   Vaginal discharge     Plan:  Recommend to Proceed with Colpo and removal of the cervical polyp  The details of the colposcopic procedure were reviewed, the risks of missed diagnoses, pain, infection, and bleeding.  She reports that she has also had vaginal discharge.  The wet prep is ordered for her and the results are negative.      TT 30 min, in addition to the time for the procedure  CT greater than 50%, as noted above in the HPI and in the Plan.     Lion Cuevas MD        Procedure:  Procedure for colposcopy and biopsy has been explained to the patient and consent obtained.    Before the procedure, it was ensured that the patient was educated regarding the nature of her findings and implications to date.  We reviewed the role of HPV and the natural history of the infection.  We also reviewed ways to minimize her future risk, the effect of HPV on the cervix and treatment options available, should they be indicated.    The pathophysiology of the cervix, including a discussion of the squamous and columnar cells, metaplasia and dysplasia have been reviewed, drawings, sketches and the pamphlets were reviewed with her.  The details of the colposcopic procedure were reviewed, the risks of missed diagnoses, pain, infection, and bleeding.  Questions seemed to be answered before proceeding and the patient then consented to the procedure.     Speculum placed in vagina and excellent visualization of cervix achieved, cervix swabbed  with acetic acid solution.    biopsies  taken (including ECC): 3  Hemostasis effected with Silver Nitrate.     Findings:  Cervix: no visible lesions and no concerning findings  Vaginal inspection: no visible lesions.  Procedure Summary: Patient tolerated procedure well and colposcopy adequate.      Assessment:   High risk HPV     Plan:  Specimens labelled and sent to pathology.  Will base further treatment on pathology findings.  Post biopsy instructions given to patient and call to discuss Pathology results.    Lion Cuevas MD        Procedure:  The polyp removal procedure was reviewed with her and the associated goals and limitations. The cervical polypoid tissue was grasped with the Allis clamp and clockwise torsion was performed to remove the polypoid tissue.  The specimen was sent to pathology.  She tolerated the procedure well.     Lion Cuevas MD

## 2019-12-11 LAB — COPATH REPORT: NORMAL

## 2020-01-02 ENCOUNTER — TELEPHONE (OUTPATIENT)
Dept: OBGYN | Facility: CLINIC | Age: 45
End: 2020-01-02

## 2020-01-02 DIAGNOSIS — N89.8 VAGINAL ODOR: ICD-10-CM

## 2020-01-02 DIAGNOSIS — N89.8 VAGINAL DISCHARGE: Primary | ICD-10-CM

## 2020-01-02 RX ORDER — METRONIDAZOLE 7.5 MG/G
1 GEL VAGINAL 2 TIMES DAILY
Qty: 70 G | Refills: 0 | Status: SHIPPED | OUTPATIENT
Start: 2020-01-02 | End: 2020-01-07

## 2020-01-02 NOTE — TELEPHONE ENCOUNTER
Reason for Call:  Other prescription    Detailed comments: patient was seen on 12-6-19 and would like the prescription for her symptoms per Dr. Cuevas.    [facility] [place], [place]    Phone Number Patient can be reached at: Cell number on file:    Telephone Information:   Mobile [phone]       Best Time: asap    Can we leave a detailed message on this number? YES    Call taken on 1/2/2020 at 3:38 PM by Lauren Coulter

## 2020-01-02 NOTE — TELEPHONE ENCOUNTER
"Patient is a 44 year old who was seen with Dr. Cuevas on 11/12/19 and 11/6/19 for her annual and colposcopy. At those appointments, \"She reports that she has had vaginal discharge and \"an odor\" that she is not able to define.  She has had this since she started having sex with her past partner.  The partner uses saliva for lubrication with sex, she has problems with bacterial infections.  She had this evaluated 3 weeks ago, and the wet prep results were negative.\"    RN called and spoke to patient, she states she understands that the wet preps were negative, but she continues to feel off and have same symptoms as previously stated. She had additional STD testing done outside of network and that came back negative.     Patient is wondering if Dr. Cuevas would treat her for these symptoms to see if it helps resolve them. RN will route to provider and mychart patient back per plan. Patient would prefer Vaginal insert gel vs. Anything oral for treatment.     Patient verbalized understanding and agreed to plan.       Andreia Santos RN on 1/2/2020 at 4:22 PM    "

## 2020-01-03 ENCOUNTER — MYC MEDICAL ADVICE (OUTPATIENT)
Dept: OBGYN | Facility: CLINIC | Age: 45
End: 2020-01-03

## 2020-01-03 NOTE — TELEPHONE ENCOUNTER
RN called and left detailed message for patient stating that Dr. Cuevas filled a prescription for her use. However, RN noted that prescription was sent to incorrect pharmacy and recommended that she call and ask for transfer of prescription and to call clinic back with any questions or concerns.     Andreia Santos RN on 1/3/2020 at 8:34 AM

## 2020-01-03 NOTE — TELEPHONE ENCOUNTER
Please see [employer] message, patient viewed and responded. No further action needed. Closing encounter.     Andreia Santos RN on 1/3/2020 at 4:21 PM

## 2020-01-03 NOTE — TELEPHONE ENCOUNTER
RN sent a [employer] message relaying message below.     Andreia Santos RN on 1/3/2020 at 10:10 AM

## 2020-02-08 ENCOUNTER — HEALTH MAINTENANCE LETTER (OUTPATIENT)
Age: 45
End: 2020-02-08

## 2020-06-22 ENCOUNTER — APPOINTMENT (OUTPATIENT)
Dept: OCCUPATIONAL MEDICINE | Facility: CLINIC | Age: 45
End: 2020-06-22

## 2020-06-22 PROCEDURE — 99000 SPECIMEN HANDLING OFFICE-LAB: CPT | Performed by: FAMILY MEDICINE

## 2020-11-08 ENCOUNTER — HEALTH MAINTENANCE LETTER (OUTPATIENT)
Age: 45
End: 2020-11-08

## 2020-12-02 ENCOUNTER — OFFICE VISIT (OUTPATIENT)
Dept: OBGYN | Facility: CLINIC | Age: 45
End: 2020-12-02
Payer: COMMERCIAL

## 2020-12-02 VITALS
OXYGEN SATURATION: 100 % | HEART RATE: 76 BPM | SYSTOLIC BLOOD PRESSURE: 129 MMHG | DIASTOLIC BLOOD PRESSURE: 76 MMHG | BODY MASS INDEX: 27.14 KG/M2 | WEIGHT: 153.2 LBS | HEIGHT: 63 IN

## 2020-12-02 DIAGNOSIS — Z30.432 ENCOUNTER FOR IUD REMOVAL: ICD-10-CM

## 2020-12-02 DIAGNOSIS — Z01.411 ENCOUNTER FOR GYNECOLOGICAL EXAMINATION WITH ABNORMAL FINDING: Primary | ICD-10-CM

## 2020-12-02 DIAGNOSIS — Z12.31 VISIT FOR SCREENING MAMMOGRAM: ICD-10-CM

## 2020-12-02 DIAGNOSIS — R87.810 CERVICAL HIGH RISK HPV (HUMAN PAPILLOMAVIRUS) TEST POSITIVE: ICD-10-CM

## 2020-12-02 PROCEDURE — 99396 PREV VISIT EST AGE 40-64: CPT | Mod: 25 | Performed by: OBSTETRICS & GYNECOLOGY

## 2020-12-02 PROCEDURE — 58301 REMOVE INTRAUTERINE DEVICE: CPT | Performed by: OBSTETRICS & GYNECOLOGY

## 2020-12-02 PROCEDURE — 87624 HPV HI-RISK TYP POOLED RSLT: CPT | Performed by: OBSTETRICS & GYNECOLOGY

## 2020-12-02 ASSESSMENT — MIFFLIN-ST. JEOR: SCORE: 1311.1

## 2020-12-02 NOTE — PROGRESS NOTES
Madeline Trent is a 45 year old female , who presents for annual exam.   No unusual bleeding, no incontinence, or unusual discharge.   She is currently using ParaGard IUD for contraception.  She does not have any apparent contraindications to use.  She has not had any apparent complications with it's use.  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  no evidence of malignancy.     PE TUBES         OB History    Para Term  AB Living   2 2 2 0 0 2   SAB TAB Ectopic Multiple Live Births   0 0 0 0 2      # Outcome Date GA Lbr Tyron/2nd Weight Sex Delivery Anes PTL Lv   2 Term 10/10/15   3.827 kg (8 lb 7 oz) F Vag-Spont   JEM      Name: Korin Purvis   1 Term 13 40w2d  3.402 kg (7 lb 8 oz) F    JEM      Name: Essence       Gyn History:  Gynecological History         Patient's last menstrual period was 11/10/2020 (approximate).     STD HPV, HSV/no PID/she has used the  ParaGard IUD      history of abnormal pap smear:  yes  Last pap:   Lab Results   Component Value Date    PAP NIL 2019           Current Outpatient Medications   Medication Sig Dispense Refill     albuterol (PROAIR  HFA/PROVENTIL HFA/VENTOLIN HFA) 108 (90 Base) MCG/ACT inhaler Inhale 2 puffs into the lungs every 6 hours       paragard intrauterine copper device One device, intrauterine for up to 10 years 1 each 0       Allergies   Allergen Reactions     Sulfa Drugs        Social History     Socioeconomic History     Marital status: Significant other     Spouse name: partner- Sarabjit     Number of children: 1     Years of education: Not on file     Highest education level: Not on file   Occupational History     Occupation: construction work      Employer: [employer]   Social Needs     Financial resource strain: Not on file     Food insecurity     Worry: Not on file     Inability: Not on file     Transportation needs     Medical: Not on file     Non-medical: Not on file   Tobacco Use     Smoking status: Never Smoker     Smokeless tobacco: Never Used   Substance and Sexual Activity     Alcohol use: Yes     Alcohol/week: 0.0 standard drinks     Comment: occasionally, not in PG     Drug use: No     Sexual activity: Yes     Partners: Male   Lifestyle     Physical activity     Days per week: Not on file     Minutes per session: Not on file     Stress: Not on file   Relationships     Social connections     Talks on phone: Not on file     Gets together: Not on file     Attends Adventism service: Not on file     Active member of club or organization: Not on file     Attends meetings of clubs or organizations: Not on file     Relationship status: Not on file     Intimate partner violence     Fear of current or ex partner: Not on file     Emotionally abused: Not on file     Physically abused: Not on file     Forced sexual activity: Not on file   Other Topics Concern     Parent/sibling w/ CABG, MI or angioplasty before 65F 55M? Not Asked      Service No     Blood Transfusions No     Caffeine Concern No     Occupational Exposure No     Hobby Hazards No     Sleep Concern No     Stress Concern No     Weight Concern No  "    Special Diet No     Back Care Yes     Exercise Yes     Comment: moderate, at work     Bike Helmet Not Asked     Comment: she does not ride a bike     Seat Belt Yes     Self-Exams Yes   Social History Narrative     Not on file       Family History   Problem Relation Age of Onset     Lipids Father      Eye Disorder Maternal Grandfather         cataract     Lipids Maternal Grandfather      Respiratory Maternal Grandfather      Cancer Paternal Grandmother         pantriatic     Arthritis Paternal Grandmother      Cancer Paternal Grandfather         skin     Respiratory Paternal Grandfather      Arthritis Paternal Grandfather      Hypertension Brother      Alcohol/Drug Brother         addict to pain killers     Cancer Maternal Grandmother          ROS:  All negative except as above.      EXAM:  /76 (BP Location: Right arm, Cuff Size: Adult Regular)   Pulse 76   Ht 1.588 m (5' 2.5\")   Wt 69.5 kg (153 lb 3.2 oz)   LMP 11/10/2020 (Approximate)   SpO2 100%   Breastfeeding No   BMI 27.57 kg/m    General:  WNWD female, NAD  Alert  Oriented x 3  Gait:  Normal  Skin:  Normal skin turgor  Neurologic:  CN grossly intact, good sensation.    HEENT:  NC/AT, EOMI  Neck:  No masses palpated, symmetrical, carotids +2/4, no bruits heard  Heart:  RRR  Lungs:  Clear   Breasts:  Symmetrical, no dimpling noted, no masses palpated, no discharge expressed  Abdomen:  Non-tender, non-distended.  Vulva: No external lesions, normal hair distribution, no adenopathy  BUS:  Normal, no masses noted  Urethra:  No hypermobility noted  Urethral meatus:  No masses noted  Vagina: Moist, pink, no abnormal discharge, well rugated, no lesions  Cervix: Smooth, pink, no visible lesions, IUD strings seen   Uterus: Normal size, anteverted, non-tender, mobile  Ovaries: No mass, non-tender, mobile  Perianal:  No masses noted.    Extremities:  No clubbing, cyanosis, or edema      ASSESSMENT/PLAN   Annual examination   Encounter for IUD removal.  She " does not desire other contraception at this time.  If she becomes sexually active again, she will call for contraceptive prescription.   HPV on prior pap.  Repeat pap smear and HPV today.    Low fat diet, weight bearing exercises and self breast exams on a monthly basis have been recommended.  I have discussed with patient the risks, benefits, medications, treatment options and modalities.   I have instructed the patient to call or schedule a follow-up appointment if any problems.    Lion Cuevas MD        PROCEDURE NOTE:  The patient desires to have the IUD removed.  She reports that she has used it for the past 5 years.  I reviewed with her about the other benefits for the procedure and the goals and limitations and she voiced her understanding, but she still desired to have the IUD removed.  The procedure was discussed and she voiced her understanding.  The patient was examined, she was prepped for the IUD removal.  The ring forceps were used to remove the IUD.  The ParaGarde IUD was shown to the patient to verify the IUD.  She tolerated the procedure.  She does not want any other methods at this time.      Lion Cuevas MD

## 2020-12-04 LAB
COPATH REPORT: NORMAL
PAP: NORMAL

## 2020-12-09 ENCOUNTER — PATIENT OUTREACH (OUTPATIENT)
Dept: OBGYN | Facility: CLINIC | Age: 45
End: 2020-12-09
Payer: COMMERCIAL

## 2020-12-09 DIAGNOSIS — R87.810 CERVICAL HIGH RISK HPV (HUMAN PAPILLOMAVIRUS) TEST POSITIVE: ICD-10-CM

## 2020-12-09 NOTE — TELEPHONE ENCOUNTER
11/30/15: Pap - ASCUS, Neg HPV. Plan cotest in 3 years.   11/7/18 NIL Pap, + HR HPV (Neg 16/18). Plan cotest in 1 year.   11/12/19 NIL Pap, + HR HPV (Neg 16/18). Plan colp  12/6/19 colpo bx neg, ECC neg, polyp neg. Cotest in 1 year  12/02/20 NIL Pap, + HR HPV (neg 16/18). Plan cotest in 1 year.   12/09/20 MyChart result note sent.

## 2021-01-18 ENCOUNTER — ANCILLARY PROCEDURE (OUTPATIENT)
Dept: MAMMOGRAPHY | Facility: CLINIC | Age: 46
End: 2021-01-18
Attending: OBSTETRICS & GYNECOLOGY
Payer: COMMERCIAL

## 2021-01-18 DIAGNOSIS — Z12.31 VISIT FOR SCREENING MAMMOGRAM: ICD-10-CM

## 2021-01-18 PROCEDURE — 77063 BREAST TOMOSYNTHESIS BI: CPT | Mod: GC | Performed by: RADIOLOGY

## 2021-01-18 PROCEDURE — 77067 SCR MAMMO BI INCL CAD: CPT | Mod: GC | Performed by: RADIOLOGY

## 2021-04-07 ENCOUNTER — OFFICE VISIT (OUTPATIENT)
Dept: OBGYN | Facility: CLINIC | Age: 46
End: 2021-04-07
Payer: COMMERCIAL

## 2021-04-07 VITALS
SYSTOLIC BLOOD PRESSURE: 90 MMHG | OXYGEN SATURATION: 98 % | BODY MASS INDEX: 27.1 KG/M2 | WEIGHT: 158.7 LBS | TEMPERATURE: 98.4 F | DIASTOLIC BLOOD PRESSURE: 70 MMHG | HEART RATE: 81 BPM | HEIGHT: 64 IN

## 2021-04-07 DIAGNOSIS — N89.8 VAGINAL IRRITATION: Primary | ICD-10-CM

## 2021-04-07 DIAGNOSIS — R87.810 CERVICAL HIGH RISK HPV (HUMAN PAPILLOMAVIRUS) TEST POSITIVE: ICD-10-CM

## 2021-04-07 DIAGNOSIS — B37.31 YEAST INFECTION OF THE VAGINA: ICD-10-CM

## 2021-04-07 LAB
SPECIMEN SOURCE: ABNORMAL
WET PREP SPEC: ABNORMAL

## 2021-04-07 PROCEDURE — 99213 OFFICE O/P EST LOW 20 MIN: CPT | Performed by: OBSTETRICS & GYNECOLOGY

## 2021-04-07 PROCEDURE — 87210 SMEAR WET MOUNT SALINE/INK: CPT | Performed by: OBSTETRICS & GYNECOLOGY

## 2021-04-07 RX ORDER — FLUCONAZOLE 150 MG/1
150 TABLET ORAL ONCE
Qty: 2 TABLET | Refills: 0 | Status: SHIPPED | OUTPATIENT
Start: 2021-04-07 | End: 2021-04-07

## 2021-04-07 RX ORDER — LORATADINE 10 MG/1
10 TABLET ORAL DAILY
COMMUNITY
End: 2024-02-29

## 2021-04-07 SDOH — ECONOMIC STABILITY: TRANSPORTATION INSECURITY
IN THE PAST 12 MONTHS, HAS THE LACK OF TRANSPORTATION KEPT YOU FROM MEDICAL APPOINTMENTS OR FROM GETTING MEDICATIONS?: NOT ASKED

## 2021-04-07 SDOH — ECONOMIC STABILITY: FOOD INSECURITY: WITHIN THE PAST 12 MONTHS, YOU WORRIED THAT YOUR FOOD WOULD RUN OUT BEFORE YOU GOT MONEY TO BUY MORE.: NOT ASKED

## 2021-04-07 SDOH — ECONOMIC STABILITY: INCOME INSECURITY: HOW HARD IS IT FOR YOU TO PAY FOR THE VERY BASICS LIKE FOOD, HOUSING, MEDICAL CARE, AND HEATING?: NOT ASKED

## 2021-04-07 SDOH — ECONOMIC STABILITY: FOOD INSECURITY: WITHIN THE PAST 12 MONTHS, THE FOOD YOU BOUGHT JUST DIDN'T LAST AND YOU DIDN'T HAVE MONEY TO GET MORE.: NOT ASKED

## 2021-04-07 SDOH — ECONOMIC STABILITY: TRANSPORTATION INSECURITY
IN THE PAST 12 MONTHS, HAS LACK OF TRANSPORTATION KEPT YOU FROM MEETINGS, WORK, OR FROM GETTING THINGS NEEDED FOR DAILY LIVING?: NOT ASKED

## 2021-04-07 ASSESSMENT — MIFFLIN-ST. JEOR: SCORE: 1354.86

## 2021-04-07 NOTE — PROGRESS NOTES
SUBJECTIVE:   46 year old female complains of small white vaginal discharge for 4 days days.  Denies abnormal vaginal bleeding or significant pelvic pain or  fever. No UTI symptoms. Denies history of known exposure to STD.  Newly sexually active new partner  Condom used  She has had some burning in the vaginal area  No bleeding no pain or fevers    Patient's last menstrual period was 03/21/2021.    OBJECTIVE:   She appears well, afebrile.  Abdomen: benign, soft, nontender, no masses.  Pelvic Exam: normal vagina and vulva noted for medial labia minora raised rash, milidly erythematous- no ulcerations no vescicles, normal cervix without lesions or tenderness, uterus normal size anteverted, wet mount exam shows monilia, exam chaperoned by nurse.      ASSESSMENT:   monilia vaginitis    PLAN:   GC and chlamydia genprobe swabs sent to lab.  Treatment: Diflucan 150 mg x 1 dose repeat x 1  ROV prn if symptoms persist or worsen.  Satinder Roque MD

## 2021-06-04 ENCOUNTER — OFFICE VISIT (OUTPATIENT)
Dept: FAMILY MEDICINE | Facility: CLINIC | Age: 46
End: 2021-06-04
Payer: COMMERCIAL

## 2021-06-04 VITALS
HEART RATE: 83 BPM | DIASTOLIC BLOOD PRESSURE: 72 MMHG | RESPIRATION RATE: 16 BRPM | SYSTOLIC BLOOD PRESSURE: 106 MMHG | WEIGHT: 156 LBS | BODY MASS INDEX: 26.63 KG/M2 | HEIGHT: 64 IN | TEMPERATURE: 99.1 F | OXYGEN SATURATION: 98 %

## 2021-06-04 DIAGNOSIS — Z30.09 COUNSELING FOR BIRTH CONTROL REGARDING INTRAUTERINE DEVICE (IUD): ICD-10-CM

## 2021-06-04 DIAGNOSIS — Z72.51 UNPROTECTED SEXUAL INTERCOURSE: ICD-10-CM

## 2021-06-04 DIAGNOSIS — J45.30 MILD PERSISTENT ASTHMA WITHOUT COMPLICATION: ICD-10-CM

## 2021-06-04 DIAGNOSIS — N76.0 VAGINITIS AND VULVOVAGINITIS: Primary | ICD-10-CM

## 2021-06-04 LAB
HCG UR QL: NEGATIVE
SPECIMEN SOURCE: NORMAL
WET PREP SPEC: NORMAL

## 2021-06-04 PROCEDURE — 87491 CHLMYD TRACH DNA AMP PROBE: CPT | Performed by: FAMILY MEDICINE

## 2021-06-04 PROCEDURE — 87591 N.GONORRHOEAE DNA AMP PROB: CPT | Performed by: FAMILY MEDICINE

## 2021-06-04 PROCEDURE — 81025 URINE PREGNANCY TEST: CPT | Performed by: FAMILY MEDICINE

## 2021-06-04 PROCEDURE — 87210 SMEAR WET MOUNT SALINE/INK: CPT | Performed by: FAMILY MEDICINE

## 2021-06-04 PROCEDURE — 99204 OFFICE O/P NEW MOD 45 MIN: CPT | Performed by: FAMILY MEDICINE

## 2021-06-04 RX ORDER — FLUTICASONE PROPIONATE AND SALMETEROL XINAFOATE 115; 21 UG/1; UG/1
2 AEROSOL, METERED RESPIRATORY (INHALATION) 2 TIMES DAILY
Qty: 12 G | Refills: 3 | Status: SHIPPED | OUTPATIENT
Start: 2021-06-04 | End: 2024-02-29

## 2021-06-04 RX ORDER — METRONIDAZOLE 7.5 MG/G
1 GEL VAGINAL DAILY
Qty: 70 G | Refills: 0 | Status: SHIPPED | OUTPATIENT
Start: 2021-06-04 | End: 2021-06-28

## 2021-06-04 ASSESSMENT — MIFFLIN-ST. JEOR: SCORE: 1342.61

## 2021-06-04 NOTE — PROGRESS NOTES
Assessment & Plan     Vaginitis and vulvovaginitis  Wet prep negative but symptoms-wise pt wanted to be treated and I agreed this could be tried, has had success with metrogel in past. We also reviewed healthy vaginal habits because I mentioned she may have irritation more from dermatitis/heat/tight clothing/new underwear and pt felt this made sense so will try that.   - Wet prep  - NEISSERIA GONORRHOEA PCR  - CHLAMYDIA TRACHOMATIS PCR  - metroNIDAZOLE (METROGEL) 0.75 % vaginal gel  Dispense: 70 g; Refill: 0    Unprotected sexual intercourse  Counseling re: IUD  Discussed it and pt desires. Reviewed that this could be false neg but also that copper IUD can work as emergency contraceptive, however due to timing I wasn't sure it would still be effective for that and also advised to come back for procedure another day  - HCG Qual, Urine ([id number])    Mild persistent asthma without complication  ACT 20, borderline at goal control. Per past experiences and current control will increase therapy to include controller ICS/LABA  - continue OTC antihistamine  - fluticasone-salmeterol (ADVAIR-HFA) 115-21 MCG/ACT inhaler  Dispense: 12 g; Refill: 3  - AEROCHAMBER    Return in about 3 days (around 6/7/2021), or if symptoms worsen or fail to improve, for IUD placement and recheck, Follow up.    Madeline Wilson MD  [facility] [place]   Madeline is a 46 year old who presents for the following health issues     HPI     Vaginal Symptoms  Onset/Duration: 3 weeks  Description:  Vaginal Discharge: white creamy   Itching (Pruritis): no  Burning sensation:  YES  Odor: YES  Accompanying Signs & Symptoms:  Urinary symptoms: no  Abdominal pain: no  Fever: no  History:   Sexually active: YES  New Partner: YES- last month, condom broke, took the plan b  Possibility of Pregnancy:  YES  Recent antibiotic use: no  Previous vaginitis issues: YES  Precipitating or alleviating factors: intercourse   Therapies  "tried and outcome: none    Feels like probably BV or yeast which she has had in the past  Last unprotected intercourse <7 days ago but partner withdrew. ~5days prior  LMP 5/7/21 -> but then had slightly dark colored discharge after the plan B so isn't sure    No douching, no new products, but got new underwear and being outside a lot, sweating a lot    Had copper iud until just Jan, got it taken out and figured she didn't need it anymore but now sexually active with new partner  Would like to have it again    Review of Systems   Constitutional, HEENT, cardiovascular, pulmonary, gi and gu systems are negative, except as otherwise noted.        Objective    /72   Pulse 83   Temp 99.1  F (37.3  C) (Tympanic)   Resp 16   Ht 1.626 m (5' 4\")   Wt 70.8 kg (156 lb)   SpO2 98%   BMI 26.78 kg/m    Body mass index is 26.78 kg/m .  Physical Exam   GENERAL: healthy, alert and no distress  RESP: lungs clear to auscultation - no rales, rhonchi or wheezes  CV: regular rate and rhythm, normal S1 S2, no S3 or S4, no murmur, click or rub, no peripheral edema and peripheral pulses strong   (female): Skin slightly macerated/irritated but otherwise normal female external genitalia, normal urethral meatus, vaginal mucosa of introitus. by bimanual: normal cervix/adnexa/uterus without masses or discharge. Spec exam not done, not indicated.  MS: no gross musculoskeletal defects noted, no edema            "

## 2021-06-05 LAB
C TRACH DNA SPEC QL NAA+PROBE: NEGATIVE
N GONORRHOEA DNA SPEC QL NAA+PROBE: NEGATIVE
SPECIMEN SOURCE: NORMAL
SPECIMEN SOURCE: NORMAL

## 2021-06-05 ASSESSMENT — ASTHMA QUESTIONNAIRES: ACT_TOTALSCORE: 20

## 2021-06-28 ENCOUNTER — OFFICE VISIT (OUTPATIENT)
Dept: FAMILY MEDICINE | Facility: CLINIC | Age: 46
End: 2021-06-28
Payer: COMMERCIAL

## 2021-06-28 VITALS
OXYGEN SATURATION: 99 % | BODY MASS INDEX: 25.95 KG/M2 | HEIGHT: 64 IN | DIASTOLIC BLOOD PRESSURE: 62 MMHG | SYSTOLIC BLOOD PRESSURE: 104 MMHG | WEIGHT: 152 LBS | RESPIRATION RATE: 18 BRPM | TEMPERATURE: 98 F | HEART RATE: 73 BPM

## 2021-06-28 DIAGNOSIS — Z30.430 ENCOUNTER FOR INSERTION OF INTRAUTERINE CONTRACEPTIVE DEVICE: Primary | ICD-10-CM

## 2021-06-28 DIAGNOSIS — L91.8 SKIN TAG, ACQUIRED: ICD-10-CM

## 2021-06-28 LAB — HCG UR QL: NEGATIVE

## 2021-06-28 PROCEDURE — 81025 URINE PREGNANCY TEST: CPT | Performed by: FAMILY MEDICINE

## 2021-06-28 PROCEDURE — 11200 RMVL SKIN TAGS UP TO&INC 15: CPT | Performed by: FAMILY MEDICINE

## 2021-06-28 PROCEDURE — 58300 INSERT INTRAUTERINE DEVICE: CPT | Performed by: FAMILY MEDICINE

## 2021-06-28 RX ORDER — LEVONORGESTREL / ETHINYL ESTRADIOL AND ETHINYL ESTRADIOL 150-30(84)
1 KIT ORAL DAILY
Qty: 30 TABLET | Refills: 0 | Status: SHIPPED | OUTPATIENT
Start: 2021-06-28 | End: 2021-07-21

## 2021-06-28 RX ORDER — COPPER 313.4 MG/1
1 INTRAUTERINE DEVICE INTRAUTERINE ONCE
Status: COMPLETED
Start: 2021-06-28 | End: 2021-06-28

## 2021-06-28 RX ORDER — COPPER 313.4 MG/1
1 INTRAUTERINE DEVICE INTRAUTERINE ONCE
COMMUNITY
End: 2021-07-21 | Stop reason: SINTOL

## 2021-06-28 RX ADMIN — COPPER 1 EACH: 313.4 INTRAUTERINE DEVICE INTRAUTERINE at 08:06

## 2021-06-28 ASSESSMENT — MIFFLIN-ST. JEOR: SCORE: 1324.47

## 2021-06-28 NOTE — PROGRESS NOTES
Assessment & Plan     Contraceptive management    Menorrhagia with regular cycle    Encounter for insertion of intrauterine contraceptive device  - HCG Qual, Urine ([id number])  - paragard intrauterine copper device  Dispense:    - paragard intrauterine copper IUD device 1 each  - INSERTION INTRAUTERINE DEVICE  - Seasonique 30 day, 0 refills    Skin tag, acquired  Single skin tag on buttock region  - REMOVAL OF SKIN TAGS, FIRST 15    Return if symptoms worsen or fail to improve.    Madeline Wilson MD  [facility]        Subjective   Madeline is a 46 year old who presents for the following health issues  accompanied by her self:    HPI     Iud placement Urine pregn done [facility] 21 signed consent given and signed     IUD Insertion:  CONSULT:    Is a pregnancy test required: Yes.  Was it positive or negative?  Negative  Was a consent obtained?  Yes    Subjective: Madeline Trent is a 46 year old  presents for IUD and desires Paragard type IUD.    Patient has been given the opportunity to ask questions about all forms of birth control, including all options appropriate for Madeline Trent. Discussed that no method of birth control, except abstinence is 100% effective against pregnancy or sexually transmitted infection. Patient inquiring about period control over the next couple of weeks, see A/P. Also inquiring about painful/irritatin skin tag of buttock and whether she can get it removed. It started during her most recent pregnancy    Madeline Trent understands she may have the IUD removed at any time. IUD should be removed by a health care provider.    The entire insertion procedure was reviewed with the patient, including care after placement.    Patient's last menstrual period was 2021 (exact date). Last sexual activity: > 14 days prior. No allergy to betadine or shellfish. Recent STD screening  HCG Qual Urine   Date Value Ref Range Status   2021 Negative  "NEG^Negative Final     Comment:     This test is for screening purposes.  Results should be interpreted along with   the clinical picture.  Confirmation testing is available if warranted by   ordering [id number], HCG Quantitative Pregnancy.       /62   Pulse 73   Temp 98  F (36.7  C) (Tympanic)   Resp 18   Ht 1.626 m (5' 4\")   Wt 68.9 kg (152 lb)   LMP 06/06/2021 (Exact Date)   SpO2 99%   BMI 26.09 kg/m    GEN: healthy, well-appearing, calm  SKIN: very small skin tag on L lower buttock  Pelvic Exam:   EG/BUS: normal genital architecture without lesions, erythema or abnormal secretions.   Vagina: moist, pink, rugae with physiologic discharge and secretions  Cervix: parous no lesions and pink, moist, closed, without lesion or CMT  Uterus: slighty posterior to midline position, mobile, no pain  Adnexa: within normal limits and no masses, nodularity, tenderness    PROCEDURE NOTE: -- IUD Insertion    Reason for Insertion: contraception    Under sterile technique, cervix was visualized with speculum and prepped with Betadine solution swab x 3. Tenaculum was placed for stability. The uterus was gently straightened and sounded to 8.0 cm. IUD prepared for placement, and IUD inserted according to 's instructions without difficulty or significant resitance, and deployed at the fundus. The strings were visualized and trimmed to 1.5 cm from the external os. Tenaculum was removed and hemostasis noted. Speculum removed.  Patient tolerated procedure well.    Lot # [id number]  Exp: July 2025    EBL: minimal    Complications: none    Procedure note: skin tag removal    Skin tag base visualized and area cleaned with alcohol wipe. Skin tag gripped with forceps and cut with sharp curved iris scissor, no bleeding. Bandaid placed. Pt tolerated procedure without issues.      ASSESSMENT:   Madeline was seen today for iud.    Diagnoses and all orders for this visit:    Encounter for insertion of intrauterine contraceptive " device  -     HCG Qual, Urine ([id number])  -     paragard intrauterine copper IUD device 1 each  -     INSERTION INTRAUTERINE DEVICE  -     30 days given for period control and backup contraception. levonorgest-eth estrad 91-Day (SEASONIQUE) 0.15-0.03 &0.01 MG tablet; Take 1 tablet by mouth daily    Skin tag, acquired  -     REMOVAL OF SKIN TAGS, FIRST 15      PLAN:    Given 's handouts, including when to have IUD removed, list of danger s/sx, side effects and follow up recommended. Encouraged condom use for prevention of STD. Back up contraception advised for 7 days if progestin method. Advised to call for any fever, for prolonged or severe pain or bleeding, abnormal vaginal discharge, or unable to palpate strings. She was advised to use pain medications (ibuprofen) as needed for mild to moderate pain. Advised to follow-up in clinic in 4-6 weeks for IUD string check if unable to find strings or as directed by provider.     Madeline Wilson MD

## 2021-06-29 ASSESSMENT — ASTHMA QUESTIONNAIRES: ACT_TOTALSCORE: 22

## 2021-07-21 ENCOUNTER — OFFICE VISIT (OUTPATIENT)
Dept: OBGYN | Facility: CLINIC | Age: 46
End: 2021-07-21
Payer: COMMERCIAL

## 2021-07-21 VITALS
OXYGEN SATURATION: 98 % | WEIGHT: 145.2 LBS | HEART RATE: 85 BPM | HEIGHT: 64 IN | SYSTOLIC BLOOD PRESSURE: 110 MMHG | BODY MASS INDEX: 24.79 KG/M2 | DIASTOLIC BLOOD PRESSURE: 70 MMHG | TEMPERATURE: 99 F

## 2021-07-21 DIAGNOSIS — N89.8 VAGINAL DISCHARGE: Primary | ICD-10-CM

## 2021-07-21 DIAGNOSIS — Z30.432 ENCOUNTER FOR REMOVAL OF INTRAUTERINE CONTRACEPTIVE DEVICE: ICD-10-CM

## 2021-07-21 LAB
CLUE CELLS: ABNORMAL
TRICHOMONAS, WET PREP: ABNORMAL
WBC'S/HIGH POWER FIELD, WET PREP: ABNORMAL
YEAST, WET PREP: ABNORMAL

## 2021-07-21 PROCEDURE — 58301 REMOVE INTRAUTERINE DEVICE: CPT | Performed by: OBSTETRICS & GYNECOLOGY

## 2021-07-21 PROCEDURE — 99212 OFFICE O/P EST SF 10 MIN: CPT | Mod: 25 | Performed by: OBSTETRICS & GYNECOLOGY

## 2021-07-21 PROCEDURE — 87210 SMEAR WET MOUNT SALINE/INK: CPT | Performed by: OBSTETRICS & GYNECOLOGY

## 2021-07-21 ASSESSMENT — MIFFLIN-ST. JEOR: SCORE: 1293.62

## 2021-07-21 NOTE — PROGRESS NOTES
CC:  Consult from herself  for vaginal discharge and IUD removal  HPI:  Madeline Trent is a 46 year old female is a   .  No LMP recorded (lmp unknown).  Menses are irregular. She had a Paragard IUD placed in  Late Juns as she had started dating again.  She has had vaginal discharge and pain since the placement.  Her new partner has complained about the IUD as well.  He has also had a Vasectomy so that she does not need contraception    Patients records are available and reviewed at today's visit.    Past GYN history:  No STD history       Last PAP smear:  Normal  Last TSH:   TSH   Date Value Ref Range Status   01/28/2013 2.01 0.4 - 5.0 mU/L Final    , normal?  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  no evidence of malignancy.     PE TUBES         Family History   Problem Relation Age of Onset     Lipids Father      Eye Disorder Maternal Grandfather         cataract     Lipids Maternal Grandfather      Respiratory Maternal Grandfather      Cancer Paternal Grandmother         pantriatic     Arthritis Paternal Grandmother      Cancer Paternal Grandfather         skin     Respiratory Paternal Grandfather      Arthritis Paternal Grandfather      Hypertension Brother      Alcohol/Drug Brother         addict to pain killers     Cancer Maternal  "Grandmother        Allergies: Sulfa drugs    Current Outpatient Medications   Medication Sig Dispense Refill     albuterol (PROAIR HFA/PROVENTIL HFA/VENTOLIN HFA) 108 (90 Base) MCG/ACT inhaler Inhale 2 puffs into the lungs every 6 hours       BORIC ACID EX        fluticasone-salmeterol (ADVAIR-HFA) 115-21 MCG/ACT inhaler Inhale 2 puffs into the lungs 2 times daily 12 g 3     paragard intrauterine copper device 1 each by Intrauterine route once       levonorgest-eth estrad 91-Day (SEASONIQUE) 0.15-0.03 &0.01 MG tablet Take 1 tablet by mouth daily (Patient not taking: Reported on 7/21/2021) 30 tablet 0     loratadine (CLARITIN) 10 MG tablet Take 10 mg by mouth daily (Patient not taking: Reported on 7/21/2021)         ROS:  C: NEGATIVE for fever, chills, change in weight  I: NEGATIVE for worrisome rashes, moles or lesions  E: NEGATIVE for vision changes or irritation  E/M: NEGATIVE for ear, mouth and throat problems  R: NEGATIVE for significant cough or SOB  CV: NEGATIVE for chest pain, palpitations or peripheral edema  GI: NEGATIVE for nausea, abdominal pain, heartburn, or change in bowel habits  : NEGATIVE for frequency, dysuria, hematuria, vaginal discharge  M: NEGATIVE for significant arthralgias or myalgia  N: NEGATIVE for weakness, dizziness or paresthesias  E: NEGATIVE for temperature intolerance, skin/hair changes  P: NEGATIVE for changes in mood or affect    EXAM:  Blood pressure 110/70, pulse 85, temperature 99  F (37.2  C), height 1.626 m (5' 4\"), weight 65.9 kg (145 lb 3.2 oz), SpO2 98 %, not currently breastfeeding.   BMI= Body mass index is 24.92 kg/m .  General - pleasant female in no acute distress.  Neck - supple without lymphadenopathy or thyromegaly.  Abdomen - soft, nontender, nondistended, no hepatosplenomegaly.  Pelvic - EG: normal adult female, BUS: within normal limits, Vagina: well rugated, no discharge,   Cervix: no lesions or CMT, IUD strings present   Uterus: firm, normal sized and " nontender, Adnexae: no masses or tenderness.  Rectovaginal - deferred.  Musculoskeletal - no gross deformities.  Neurological - normal strength, sensation, and mental status.    Procedure:  After  consent was obtained from the patient,  Speculum was placed in the vagina the cervix was isolated.  The IUD strings were readily visible.  The IUD strings were grasped with ring forcep and IUD easily removed intact with minimal patient discomfort noted.  No bleeding noted.     ASSESSMENT/PLAN:  (N89.8) Vaginal discharge  (primary encounter diagnosis)  Comment:   Plan: Wet prep - Clinic Collect        Negative    (Z30.432) Encounter for removal of intrauterine contraceptive device  Comment:   Plan: REMOVE INTRAUTERINE DEVICE                  Letter will be sent to the referring provider.    Satinder Roque MD

## 2021-09-11 ENCOUNTER — HEALTH MAINTENANCE LETTER (OUTPATIENT)
Age: 46
End: 2021-09-11

## 2021-12-03 ENCOUNTER — OFFICE VISIT (OUTPATIENT)
Dept: OBGYN | Facility: CLINIC | Age: 46
End: 2021-12-03
Payer: COMMERCIAL

## 2021-12-03 VITALS
HEART RATE: 80 BPM | WEIGHT: 147 LBS | SYSTOLIC BLOOD PRESSURE: 124 MMHG | OXYGEN SATURATION: 99 % | HEIGHT: 63 IN | DIASTOLIC BLOOD PRESSURE: 75 MMHG | BODY MASS INDEX: 26.05 KG/M2

## 2021-12-03 DIAGNOSIS — Z11.3 SCREEN FOR STD (SEXUALLY TRANSMITTED DISEASE): ICD-10-CM

## 2021-12-03 DIAGNOSIS — Z12.31 VISIT FOR SCREENING MAMMOGRAM: ICD-10-CM

## 2021-12-03 DIAGNOSIS — R87.810 CERVICAL HIGH RISK HPV (HUMAN PAPILLOMAVIRUS) TEST POSITIVE: ICD-10-CM

## 2021-12-03 DIAGNOSIS — N84.1 CERVICAL POLYP: ICD-10-CM

## 2021-12-03 DIAGNOSIS — Z01.411 ENCOUNTER FOR GYNECOLOGICAL EXAMINATION WITH ABNORMAL FINDING: Primary | ICD-10-CM

## 2021-12-03 LAB
HBV SURFACE AG SERPL QL IA: NONREACTIVE
HIV 1+2 AB+HIV1 P24 AG SERPL QL IA: NONREACTIVE
T PALLIDUM AB SER QL: NONREACTIVE

## 2021-12-03 PROCEDURE — 88305 TISSUE EXAM BY PATHOLOGIST: CPT | Performed by: PATHOLOGY

## 2021-12-03 PROCEDURE — 99396 PREV VISIT EST AGE 40-64: CPT | Mod: 25 | Performed by: OBSTETRICS & GYNECOLOGY

## 2021-12-03 PROCEDURE — 88175 CYTOPATH C/V AUTO FLUID REDO: CPT | Performed by: OBSTETRICS & GYNECOLOGY

## 2021-12-03 PROCEDURE — 86780 TREPONEMA PALLIDUM: CPT | Performed by: OBSTETRICS & GYNECOLOGY

## 2021-12-03 PROCEDURE — 87340 HEPATITIS B SURFACE AG IA: CPT | Performed by: OBSTETRICS & GYNECOLOGY

## 2021-12-03 PROCEDURE — 87591 N.GONORRHOEAE DNA AMP PROB: CPT | Performed by: OBSTETRICS & GYNECOLOGY

## 2021-12-03 PROCEDURE — 36415 COLL VENOUS BLD VENIPUNCTURE: CPT | Performed by: OBSTETRICS & GYNECOLOGY

## 2021-12-03 PROCEDURE — 87491 CHLMYD TRACH DNA AMP PROBE: CPT | Performed by: OBSTETRICS & GYNECOLOGY

## 2021-12-03 PROCEDURE — 87624 HPV HI-RISK TYP POOLED RSLT: CPT | Performed by: OBSTETRICS & GYNECOLOGY

## 2021-12-03 PROCEDURE — 87389 HIV-1 AG W/HIV-1&-2 AB AG IA: CPT | Performed by: OBSTETRICS & GYNECOLOGY

## 2021-12-03 PROCEDURE — 57500 BIOPSY OF CERVIX: CPT | Performed by: OBSTETRICS & GYNECOLOGY

## 2021-12-03 ASSESSMENT — MIFFLIN-ST. JEOR: SCORE: 1289.88

## 2021-12-03 NOTE — PROGRESS NOTES
Madeline Trent is a 46 year old female , who presents for annual exam.   No unusual bleeding, no incontinence, or unusual discharge.   She is not currently using any products for contraception.  She had the IUD, then it was removed and then she had it reinserted and then removed again.  She is not currently sexually active.  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  no evidence of malignancy.     PE TUBES         OB History    Para Term  AB Living   2 2 2 0 0 2   SAB IAB Ectopic Multiple Live Births   0 0 0 0 2      # Outcome Date GA Lbr Tyron/2nd Weight Sex Delivery Anes PTL Lv   2 Term 10/10/15   3.827 kg (8 lb 7 oz) F Vag-Spont   JEM      Name: Korin Purvis   1 Term 13 40w2d  3.402 kg (7 lb 8 oz) F    JEM      Name: Essence       Gyn History:  Gynecological History         Patient's last menstrual period was 2021.     STD HPV/HSV/No PID/She has used the ParaGard IUD      history of abnormal pap smear:  yes  Last pap:   Lab Results   Component Value Date    PAP NIL 2020           Current Outpatient Medications   Medication Sig Dispense Refill     fluticasone-salmeterol (ADVAIR-HFA)  115-21 MCG/ACT inhaler Inhale 2 puffs into the lungs 2 times daily 12 g 3     albuterol (PROAIR HFA/PROVENTIL HFA/VENTOLIN HFA) 108 (90 Base) MCG/ACT inhaler Inhale 2 puffs into the lungs every 6 hours       BORIC ACID EX        loratadine (CLARITIN) 10 MG tablet Take 10 mg by mouth daily (Patient not taking: Reported on 7/21/2021)         Allergies   Allergen Reactions     Sulfa Drugs        Social History     Socioeconomic History     Marital status: Significant other     Spouse name: partner- Sarabjit     Number of children: 2     Years of education: Not on file     Highest education level: Not on file   Occupational History     Occupation: construction work      Employer: [employer]   Tobacco Use     Smoking status: Never Smoker     Smokeless tobacco: Never Used   Vaping Use     Vaping Use: Never used   Substance and Sexual Activity     Alcohol use: Yes     Alcohol/week: 0.0 standard drinks     Comment: occasionally, not in PG     Drug use: No     Sexual activity: Yes     Partners: Male     Birth control/protection: Condom, Male Surgical   Other Topics Concern     Parent/sibling w/ CABG, MI or angioplasty before 65F 55M? Not Asked      Service No     Blood Transfusions No     Caffeine Concern No     Occupational Exposure No     Hobby Hazards No     Sleep Concern No     Stress Concern No     Weight Concern No     Special Diet No     Back Care Yes     Exercise Yes     Comment: moderate, at work     Bike Helmet Not Asked     Comment: she does not ride a bike     Seat Belt Yes     Self-Exams Yes   Social History Narrative     Not on file     Social Determinants of Health     Financial Resource Strain: Not on file   Food Insecurity: Not on file   Transportation Needs: Not on file   Physical Activity: Not on file   Stress: Not on file   Social Connections: Not on file   Intimate Partner Violence: Not on file   Housing Stability: Not on file       Family History   Problem Relation Age of Onset  "    Lipids Father      Eye Disorder Maternal Grandfather         cataract     Lipids Maternal Grandfather      Respiratory Maternal Grandfather      Cancer Paternal Grandmother         pantriatic     Arthritis Paternal Grandmother      Cancer Paternal Grandfather         skin     Respiratory Paternal Grandfather      Arthritis Paternal Grandfather      Hypertension Brother      Alcohol/Drug Brother         addict to pain killers     Cancer Maternal Grandmother          ROS:  All negative except as above.      EXAM:  /75 (BP Location: Right arm, Cuff Size: Adult Regular)   Pulse 80   Ht 1.607 m (5' 3.25\")   Wt 66.7 kg (147 lb)   LMP 12/03/2021   SpO2 99%   Breastfeeding No   BMI 25.83 kg/m    General:  WNWD female, NAD  Alert  Oriented x 3  Gait:  Normal  Skin:  Normal skin turgor  Neurologic:  CN grossly intact, good sensation.    HEENT:  NC/AT, EOMI  Neck:  No masses palpated, symmetrical, carotids +2/4, no bruits heard  Heart:  RRR  Lungs:  Clear   Breasts:  Symmetrical, no dimpling noted, no masses palpated, no discharge expressed  Abdomen:  Non-tender, non-distended.  Vulva: No external lesions, normal hair distribution, no adenopathy  BUS:  Normal, no masses noted  Urethra:  No hypermobility noted  Urethral meatus:  No masses noted  Vagina: Moist, pink, no abnormal discharge, well rugated, no lesions  Cervix: Smooth, pink, cervical polyps seen.    Uterus: Normal size, anteverted, non-tender, mobile  Ovaries: No mass, non-tender, mobile  Perianal:  No masses noted.   Extremities:  No clubbing, cyanosis, or edema      ASSESSMENT/PLAN   Annual examination   HR HPV:  Repeat pap smear and HPV today  Cervical polyps:  Recommend removal.    STD testing desired.  With the blood from menses, the wet prep is not ordered.  The other more common STD tests are ordered.  We discussed incubation times and that might limit the test results.   Schedule for the mammogram.   Low fat diet, weight bearing exercises and " self breast exams on a monthly basis have been recommended.  I have discussed with patient the risks, benefits, medications, treatment options and modalities.   I have instructed the patient to call or schedule a follow-up appointment if any problems.        PROCEDURE:  The procedure was discussed and she consented to the removal.   The Allis clamp was used to grasp the polyp and the polyp was removed with clockwise torsion.  The second polyp was then grasped and removed with clockwise torsion.  Hemostasis was noted.  Both were sent together in the specimen container and sent to pathology.   She tolerated the procedure well.      Lion Cuevas MD

## 2021-12-04 LAB
C TRACH DNA SPEC QL PROBE+SIG AMP: NEGATIVE
N GONORRHOEA DNA SPEC QL NAA+PROBE: NEGATIVE

## 2021-12-07 LAB
PATH REPORT.COMMENTS IMP SPEC: NORMAL
PATH REPORT.COMMENTS IMP SPEC: NORMAL
PATH REPORT.FINAL DX SPEC: NORMAL
PATH REPORT.GROSS SPEC: NORMAL
PATH REPORT.MICROSCOPIC SPEC OTHER STN: NORMAL
PATH REPORT.RELEVANT HX SPEC: NORMAL
PHOTO IMAGE: NORMAL

## 2021-12-08 LAB
BKR LAB AP GYN ADEQUACY: NORMAL
BKR LAB AP GYN INTERPRETATION: NORMAL
BKR LAB AP HPV REFLEX: NORMAL
BKR LAB AP LMP: NORMAL
BKR LAB AP PREVIOUS ABNL DX: NORMAL
BKR LAB AP PREVIOUS ABNORMAL: NORMAL
PATH REPORT.COMMENTS IMP SPEC: NORMAL
PATH REPORT.COMMENTS IMP SPEC: NORMAL
PATH REPORT.RELEVANT HX SPEC: NORMAL

## 2021-12-13 ENCOUNTER — PATIENT OUTREACH (OUTPATIENT)
Dept: OBGYN | Facility: CLINIC | Age: 46
End: 2021-12-13
Payer: COMMERCIAL

## 2021-12-13 DIAGNOSIS — R87.810 CERVICAL HIGH RISK HPV (HUMAN PAPILLOMAVIRUS) TEST POSITIVE: ICD-10-CM

## 2021-12-13 LAB
HUMAN PAPILLOMA VIRUS 16 DNA: NEGATIVE
HUMAN PAPILLOMA VIRUS 18 DNA: NEGATIVE
HUMAN PAPILLOMA VIRUS FINAL DIAGNOSIS: ABNORMAL
HUMAN PAPILLOMA VIRUS OTHER HR: POSITIVE

## 2021-12-28 ENCOUNTER — OFFICE VISIT (OUTPATIENT)
Dept: OBGYN | Facility: CLINIC | Age: 46
End: 2021-12-28
Payer: COMMERCIAL

## 2021-12-28 VITALS
OXYGEN SATURATION: 100 % | DIASTOLIC BLOOD PRESSURE: 84 MMHG | WEIGHT: 147.8 LBS | HEART RATE: 83 BPM | SYSTOLIC BLOOD PRESSURE: 151 MMHG | BODY MASS INDEX: 25.98 KG/M2

## 2021-12-28 DIAGNOSIS — R87.810 CERVICAL HIGH RISK HPV (HUMAN PAPILLOMAVIRUS) TEST POSITIVE: Primary | ICD-10-CM

## 2021-12-28 DIAGNOSIS — N84.1 CERVICAL POLYP: ICD-10-CM

## 2021-12-28 PROCEDURE — 57454 BX/CURETT OF CERVIX W/SCOPE: CPT | Performed by: OBSTETRICS & GYNECOLOGY

## 2021-12-28 PROCEDURE — 88305 TISSUE EXAM BY PATHOLOGIST: CPT | Performed by: PATHOLOGY

## 2021-12-28 NOTE — PROGRESS NOTES
Patient Name: Madeline Trent              Date: 12/28/2021   YOB: 1975                         Age: 46 year old   Phone: [phone] (home)   ________________________________________________________________________  Madeline is here today to discuss the pap smear, findings and further evaluation.  The patient's pap smear history is as noted:   11/30/15: Pap - ASCUS, Neg HPV. Plan cotest in 3 years.   11/7/18 NIL Pap, + HR HPV (Neg 16/18). Plan cotest in 1 year.   11/12/19 NIL Pap, + HR HPV (Neg 16/18). Plan colp  12/6/19 colpo bx neg, ECC neg, polyp neg. Cotest in 1 year  12/02/20 NIL Pap, + HR HPV (neg 16/18). Plan cotest in 1 year.   12/3/21 NIL Pap, + HR HPV (neg 16/18).   I attempted to ensure that the patient was educated regarding the nature of her findings and implications to date.  We reviewed the role of HPV, incidence in the population and the natural history of the infection, and its transmission.  We also reviewed ways to minimize her future risk, the effect of HPV on the cervix and treatment options available, should they be indicated.    The pathophysiology of the cervix, including a discussion of the squamous and columnar cells, metaplasia and dysplasia have been reviewed, drawings, sketches and the pamphlets were reviewed with her.      Past Medical History:   Diagnosis Date     Abnormal Pap smear of cervix 11/2015    see problem list     Asthma      Benign tumor of fallopian tube and uterine ligaments 2009     Cervical high risk HPV (human papillomavirus) test positive 11/07/2018 2019, 2020, 2021     Genital herpes        Past Surgical History:   Procedure Laterality Date     ADENOIDECTOMY       COLONOSCOPY N/A 2/27/2019    Procedure: COMBINED COLONOSCOPY, SINGLE OR MULTIPLE BIOPSY/POLYPECTOMY BY BIOPSY;  Surgeon: Narinder Parker, DO;  Location: [facility]     COLPOSCOPY, BIOPSY, COMBINED       HC EXCIS PRIMARY GANGLION WRIST       HC REMOVAL OF OVARIAN CYST(S)  2009    right  adnexal cyst:  benign focally papillary serous cystadenoma.  no evidence of malignancy.     PE TUBES          Outpatient Encounter Medications as of 12/28/2021   Medication Sig Dispense Refill     albuterol (PROAIR HFA/PROVENTIL HFA/VENTOLIN HFA) 108 (90 Base) MCG/ACT inhaler Inhale 2 puffs into the lungs every 6 hours       BORIC ACID EX        loratadine (CLARITIN) 10 MG tablet Take 10 mg by mouth daily        fluticasone-salmeterol (ADVAIR-HFA) 115-21 MCG/ACT inhaler Inhale 2 puffs into the lungs 2 times daily 12 g 3     No facility-administered encounter medications on file as of 12/28/2021.        Allergies as of 12/28/2021 - Reviewed 12/28/2021   Allergen Reaction Noted     Sulfa drugs  08/12/2009       Social History     Socioeconomic History     Marital status: Significant other     Spouse name: partner- Sarabjit     Number of children: 2     Years of education: None     Highest education level: None   Occupational History     Occupation: construction work      Employer: [employer]   Tobacco Use     Smoking status: Never Smoker     Smokeless tobacco: Never Used   Vaping Use     Vaping Use: Never used   Substance and Sexual Activity     Alcohol use: Yes     Alcohol/week: 0.0 standard drinks     Comment: occasionally, not in PG     Drug use: No     Sexual activity: Yes     Partners: Male     Birth control/protection: Condom   Other Topics Concern     Parent/sibling w/ CABG, MI or angioplasty before 65F 55M? Not Asked      Service No     Blood Transfusions No     Caffeine Concern No     Occupational Exposure No     Hobby Hazards No     Sleep Concern No     Stress Concern No     Weight Concern No     Special Diet No     Back Care Yes     Exercise Yes     Comment: moderate, at work     Bike Helmet Not Asked     Comment: she does not ride a bike     Seat Belt Yes     Self-Exams Yes   Social History Narrative     None     Social Determinants of Health     Financial Resource Strain: Not on  file   Food Insecurity: Not on file   Transportation Needs: Not on file   Physical Activity: Not on file   Stress: Not on file   Social Connections: Not on file   Intimate Partner Violence: Not on file   Housing Stability: Not on file        Family History   Problem Relation Age of Onset     Lipids Father      Eye Disorder Maternal Grandfather         cataract     Lipids Maternal Grandfather      Respiratory Maternal Grandfather      Cancer Paternal Grandmother         pantriatic     Arthritis Paternal Grandmother      Cancer Paternal Grandfather         skin     Respiratory Paternal Grandfather      Arthritis Paternal Grandfather      Hypertension Brother      Alcohol/Drug Brother         addict to pain killers     Cancer Maternal Grandmother          Review Of Systems  10 point ROS of systems including Constitutional, Eyes, Respiratory, Cardiovascular, Gastroenterology, Genitourinary, Integumentary, Muscularskeletal, Psychiatric were all negative except for pertinent positives noted in my HPI and in the PMH.      Exam:   BP (!) 151/84 (BP Location: Right arm, Cuff Size: Adult Regular)   Pulse 83   Wt 67 kg (147 lb 12.8 oz)   LMP 12/03/2021 (Exact Date)   SpO2 100%   Breastfeeding No   BMI 25.98 kg/m    GENERAL:  WNWD female NAD  HEENT: NC/AT, EOMI  Lungs:  Good respiratory effort   SKIN: normal skin turgor  GAIT: Normal  NECK: Symmetrical, no masses noted   VULVA: Normal Genitalia  BUS: Normal  URETHRA:  No hypermobility noted  URETHRAL MEATUS:  No masses noted  VAGINA: Normal mucosa, no discharge  CERVIX: Closed, mobile, no discharge, polyploid like tissue in the canal of cervix   PERIANAL:  No masses or lesions seen  EXTREMITIES: no clubbing, cyanosis, or edema    Assessment:  HR HPV of cervix     Plan:  Recommend to Proceed with Colpo  The details of the colposcopic procedure were reviewed, the risks of missed diagnoses, pain, infection, and bleeding.      Lion Cuevas MD        Procedure:  Procedure  for colposcopy and biopsy has been explained to the patient and consent obtained.    Before the procedure, it was ensured that the patient was educated regarding the nature of her findings and implications to date.  We reviewed the role of HPV and the natural history of the infection.  We also reviewed ways to minimize her future risk, the effect of HPV on the cervix and treatment options available, should they be indicated.    The pathophysiology of the cervix, including a discussion of the squamous and columnar cells, metaplasia and dysplasia have been reviewed, drawings, sketches and the pamphlets were reviewed with her.  The details of the colposcopic procedure were reviewed, the risks of missed diagnoses, pain, infection, and bleeding.  Questions seemed to be answered before proceeding and the patient then consented to the procedure.     Speculum placed in vagina and excellent visualization of cervix achieved, cervix swabbed  with acetic acid solution.    biopsies taken (including ECC): 4   Hemostasis effected with Silver Nitrate.     Findings:    Cervix: no visible lesions, small polypoid like tissue seen in the canal of the cervix which was removed with the tishler biopsy and sent with the ECC.  3 additional biopsies taken on the TZ at 3, 10 and 12 o'clock   Vaginal inspection: no visible lesions.  Procedure Summary: Patient tolerated procedure well and colposcopy adequate.      Assessment:   HR HPV of cervix     Plan:  Specimens labelled and sent to pathology.  Will base further treatment on pathology findings.  Post biopsy instructions given to patient and call to discuss Pathology results.    Lion Cuevas MD

## 2022-01-05 ENCOUNTER — PATIENT OUTREACH (OUTPATIENT)
Dept: OBGYN | Facility: CLINIC | Age: 47
End: 2022-01-05
Payer: COMMERCIAL

## 2022-03-29 ENCOUNTER — OFFICE VISIT (OUTPATIENT)
Dept: FAMILY MEDICINE | Facility: CLINIC | Age: 47
End: 2022-03-29
Payer: COMMERCIAL

## 2022-03-29 VITALS
WEIGHT: 153.8 LBS | HEART RATE: 96 BPM | RESPIRATION RATE: 16 BRPM | DIASTOLIC BLOOD PRESSURE: 60 MMHG | HEIGHT: 63 IN | SYSTOLIC BLOOD PRESSURE: 118 MMHG | OXYGEN SATURATION: 96 % | BODY MASS INDEX: 27.25 KG/M2

## 2022-03-29 DIAGNOSIS — N92.6 MISSED PERIOD: ICD-10-CM

## 2022-03-29 DIAGNOSIS — R63.5 UNEXPLAINED WEIGHT GAIN: ICD-10-CM

## 2022-03-29 DIAGNOSIS — N76.0 VAGINITIS AND VULVOVAGINITIS: Primary | ICD-10-CM

## 2022-03-29 LAB
ANION GAP SERPL CALCULATED.3IONS-SCNC: 10 MMOL/L (ref 3–14)
BUN SERPL-MCNC: 11 MG/DL (ref 7–30)
CALCIUM SERPL-MCNC: 8.9 MG/DL (ref 8.5–10.1)
CHLORIDE BLD-SCNC: 105 MMOL/L (ref 94–109)
CLUE CELLS: PRESENT
CO2 SERPL-SCNC: 21 MMOL/L (ref 20–32)
CREAT SERPL-MCNC: 0.76 MG/DL (ref 0.52–1.04)
ERYTHROCYTE [DISTWIDTH] IN BLOOD BY AUTOMATED COUNT: 12.3 % (ref 10–15)
GFR SERPL CREATININE-BSD FRML MDRD: >90 ML/MIN/1.73M2
GLUCOSE BLD-MCNC: 84 MG/DL (ref 70–99)
HCT VFR BLD AUTO: 40.8 % (ref 35–47)
HGB BLD-MCNC: 14.1 G/DL (ref 11.7–15.7)
HOLD SPECIMEN: NORMAL
MCH RBC QN AUTO: 30.6 PG (ref 26.5–33)
MCHC RBC AUTO-ENTMCNC: 34.6 G/DL (ref 31.5–36.5)
MCV RBC AUTO: 89 FL (ref 78–100)
PLATELET # BLD AUTO: 203 10E3/UL (ref 150–450)
POTASSIUM BLD-SCNC: 3.7 MMOL/L (ref 3.4–5.3)
RBC # BLD AUTO: 4.61 10E6/UL (ref 3.8–5.2)
SODIUM SERPL-SCNC: 136 MMOL/L (ref 133–144)
TRICHOMONAS, WET PREP: ABNORMAL
TSH SERPL DL<=0.005 MIU/L-ACNC: 1.84 MU/L (ref 0.4–4)
WBC # BLD AUTO: 8.5 10E3/UL (ref 4–11)
WBC'S/HIGH POWER FIELD, WET PREP: ABNORMAL
YEAST, WET PREP: ABNORMAL

## 2022-03-29 PROCEDURE — 87210 SMEAR WET MOUNT SALINE/INK: CPT | Performed by: FAMILY MEDICINE

## 2022-03-29 PROCEDURE — 85027 COMPLETE CBC AUTOMATED: CPT | Performed by: FAMILY MEDICINE

## 2022-03-29 PROCEDURE — 36415 COLL VENOUS BLD VENIPUNCTURE: CPT | Performed by: FAMILY MEDICINE

## 2022-03-29 PROCEDURE — 84443 ASSAY THYROID STIM HORMONE: CPT | Performed by: FAMILY MEDICINE

## 2022-03-29 PROCEDURE — 80048 BASIC METABOLIC PNL TOTAL CA: CPT | Performed by: FAMILY MEDICINE

## 2022-03-29 PROCEDURE — 99214 OFFICE O/P EST MOD 30 MIN: CPT | Performed by: FAMILY MEDICINE

## 2022-03-29 RX ORDER — METRONIDAZOLE 500 MG/1
500 TABLET ORAL 2 TIMES DAILY
Qty: 14 TABLET | Refills: 2 | Status: SHIPPED | OUTPATIENT
Start: 2022-03-29 | End: 2022-03-31 | Stop reason: SINTOL

## 2022-03-29 RX ORDER — NORTRIPTYLINE HCL 25 MG
25 CAPSULE ORAL
COMMUNITY
Start: 2022-01-05 | End: 2024-02-29

## 2022-03-29 RX ORDER — SUMATRIPTAN 100 MG/1
TABLET, FILM COATED ORAL
COMMUNITY
Start: 2022-01-05 | End: 2024-02-29

## 2022-03-29 RX ORDER — CETIRIZINE HYDROCHLORIDE 10 MG/1
TABLET ORAL EVERY 24 HOURS
COMMUNITY

## 2022-03-29 RX ORDER — MAGNESIUM SULFATE HEPTAHYDRATE 40 MG/ML
INJECTION, SOLUTION INTRAVENOUS
COMMUNITY
End: 2022-03-29

## 2022-03-29 ASSESSMENT — PAIN SCALES - GENERAL: PAINLEVEL: SEVERE PAIN (7)

## 2022-03-29 ASSESSMENT — ASTHMA QUESTIONNAIRES: ACT_TOTALSCORE: 22

## 2022-03-29 NOTE — PROGRESS NOTES
Assessment & Plan     Vaginitis and vulvovaginitis  Recurrent, known triggers, and she recognizes her symptoms. Has tried other preventive behaviors and also doesn't get these super frequently therefore I did provide flagyl with some refills for self treating in case of recurrence again  - Wet prep - Clinic Collect  - metroNIDAZOLE (FLAGYL) 500 MG tablet  Dispense: 14 tablet; Refill: 2    Unexplained weight gain  Missed period  Already had a negative urine HCG at home. Reliable time window of testing. Could easily have just had stressor or be perimenopausal. I advised to just monitor for another few weeks re:menstruation, but in lieu of also recent lifestyle changes, I advised against dieting (she reports only small amount of diet change) and reviewed HAES principles, including benefits of healthy diet and exercise regardless of body size. Also advise the workup below  - TSH with free T4 reflex  - CBC with platelets  - Extra Tube  - Basic metabolic panel  (Ca, Cl, CO2, Creat, Gluc, K, Na, BUN)  - Basic metabolic panel  (Ca, Cl, CO2, Creat, Gluc, K, Na, BUN)  - Extra Tube  - CBC with platelets  - TSH with free T4 reflex    Return if symptoms worsen or fail to improve.    Madeline Wilson MD  [facility]   Madeline is a 47 year old who presents for the following health issues     History of Present Illness       Reason for visit:  Premenopausal testing/ std testing  Symptom onset:  1-2 weeks ago  Symptoms include:  Missed period  Symptom intensity:  Mild  Symptom progression:  Staying the same  Had these symptoms before:  No  What makes it worse:  No  What makes it better:  No    She eats 0-1 servings of fruits and vegetables daily.She consumes 0 sweetened beverage(s) daily.She exercises with enough effort to increase her heart rate 10 to 19 minutes per day.  She exercises with enough effort to increase her heart rate 3 or less days per week.   She is taking medications  "regularly.     Vaginal Symptoms  Onset/Duration: 2-4 weeks  Description:  Vaginal Discharge: yellowish   Itching (Pruritis): no  Burning sensation:  no  Odor: YES  Accompanying Signs & Symptoms:  Urinary symptoms: no  Abdominal pain: no  Fever: no  History:   Sexually active: YES  New Partner: no  Possibility of Pregnancy:  YES - last intercourse 2/4, had menses in February but not since  Recent antibiotic use: no  Previous vaginitis issues: YES  Precipitating or alleviating factors: None  Therapies tried and outcome: boric acid suppository    Last intercourse was 2/4  Patient's last menstrual period was 02/18/2022 (within days).    Late for current period 3/18   She has always been regular and she is wondering if this is pre-menopause  She used a couple of boric acid suppositories but didn't really feel like it helped  The one at home she did only a week ago and it was negative    Review of Systems   As above      Objective    /60 (BP Location: Right arm, Patient Position: Sitting, Cuff Size: Adult Regular)   Pulse 96   Resp 16   Ht 1.607 m (5' 3.25\")   Wt 69.8 kg (153 lb 12.8 oz)   LMP 02/18/2022 (Within Days)   SpO2 96%   Breastfeeding No   BMI 27.03 kg/m    Body mass index is 27.03 kg/m .  Physical Exam   GENERAL: healthy, alert and no distress  RESP: normal respiratory effort, speaking in complete sentences  MS: no gross musculoskeletal defects noted, no edema  PSYCH: mentation appears normal, affect normal/bright            "

## 2022-03-31 DIAGNOSIS — N76.0 BACTERIAL VAGINOSIS: Primary | ICD-10-CM

## 2022-03-31 DIAGNOSIS — B96.89 BACTERIAL VAGINOSIS: Primary | ICD-10-CM

## 2022-03-31 RX ORDER — METRONIDAZOLE 7.5 MG/G
1 GEL VAGINAL DAILY
Qty: 70 G | Refills: 1 | Status: SHIPPED | OUTPATIENT
Start: 2022-03-31 | End: 2022-04-01

## 2022-03-31 NOTE — TELEPHONE ENCOUNTER
RN called Devon the Pharmacist and he said that because patient is covered under [place] [employer] that the ordering Provider must be also licensed to care for patients in [place].     RN will route to Shanta Tapia NP for assistance on this.     Routing also to Dr. Wilson for ISAIAS Ruth RN BSN PHN

## 2022-03-31 NOTE — TELEPHONE ENCOUNTER
Reason for call:  Patient reporting a symptom    Symptom or request: Pt saw Dr. Wilson in clinic 3/29 and was prescribed MetroNIDAZOLE (FLAGYL) 500 MG tablet - This med is giving pt a headache.  Pt would lisbeth Rx changed to the gel.  [facility]  Please call patient and advise.    Duration (how long have symptoms been present): ongoing    Have you been treated for this before? Yes    Additional comments:     Phone Number patient can be reached at:  Home number on file [phone] (home)    Best Time:  any    Can we leave a detailed message on this number:  YES    Call taken on 3/31/2022 at 10:15 AM by Gail Oseguera

## 2022-03-31 NOTE — TELEPHONE ENCOUNTER
Devon  Someone with [place] credentials needs to order this medication for Madeline please  ASAP

## 2022-04-01 RX ORDER — METRONIDAZOLE 7.5 MG/G
1 GEL VAGINAL DAILY
Qty: 70 G | Refills: 1 | Status: SHIPPED | OUTPATIENT
Start: 2022-04-01 | End: 2024-02-29

## 2022-04-01 NOTE — TELEPHONE ENCOUNTER
I sent the order for the patient. Please let her know that [facility] and I CC'd Dr. Wilson for FYI.  Shanta Johnston NP on 4/1/2022 at 8:33 AM

## 2022-04-23 ENCOUNTER — HEALTH MAINTENANCE LETTER (OUTPATIENT)
Age: 47
End: 2022-04-23

## 2022-05-24 ENCOUNTER — OFFICE VISIT (OUTPATIENT)
Dept: DERMATOLOGY | Facility: CLINIC | Age: 47
End: 2022-05-24
Payer: COMMERCIAL

## 2022-05-24 VITALS — SYSTOLIC BLOOD PRESSURE: 114 MMHG | HEART RATE: 69 BPM | DIASTOLIC BLOOD PRESSURE: 71 MMHG | OXYGEN SATURATION: 100 %

## 2022-05-24 DIAGNOSIS — D18.01 CHERRY ANGIOMA: ICD-10-CM

## 2022-05-24 DIAGNOSIS — D23.9 DERMATOFIBROMA: ICD-10-CM

## 2022-05-24 DIAGNOSIS — L82.1 SEBORRHEIC KERATOSIS: ICD-10-CM

## 2022-05-24 DIAGNOSIS — L81.4 LENTIGO: ICD-10-CM

## 2022-05-24 DIAGNOSIS — D22.9 MULTIPLE BENIGN NEVI: Primary | ICD-10-CM

## 2022-05-24 PROCEDURE — 99203 OFFICE O/P NEW LOW 30 MIN: CPT | Performed by: PHYSICIAN ASSISTANT

## 2022-05-24 NOTE — NURSING NOTE
Chief Complaint   Patient presents with     Skin Check     Back and face        Vitals:    05/24/22 1153   BP: 114/71   BP Location: Left arm   Patient Position: Sitting   Cuff Size: Adult Regular   Pulse: 69   SpO2: 100%     Wt Readings from Last 1 Encounters:   03/29/22 69.8 kg (153 lb 12.8 oz)       Ann Handley LPN .................5/24/2022

## 2022-05-24 NOTE — PROGRESS NOTES
Madeline Trent is an extremely pleasant 47 year old year old female patient here today for skin check. 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 She does wear sunscreen and reapplies often. No painful or bleeding skin lesions.   Patient has no other skin complaints today.  Remainder of the HPI, Meds, PMH, Allergies, FH, and SH was reviewed in chart.    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  no evidence of malignancy.     PE TUBES          Family History   Problem Relation Age of Onset     Lipids Father      Eye Disorder Maternal Grandfather         cataract     Lipids Maternal Grandfather      Respiratory Maternal Grandfather      Cancer Paternal Grandmother         pantriatic     Arthritis Paternal Grandmother      Cancer Paternal Grandfather         skin     Respiratory Paternal Grandfather      Arthritis Paternal Grandfather      Hypertension Brother      Alcohol/Drug Brother         addict to pain killers     Cancer Maternal Grandmother        Social History     Socioeconomic History     Marital status: Significant other     Spouse name: partner- Sarabjit     Number of children: 2     Years of education: Not on file     Highest education level: Not on file   Occupational  History     Occupation: construction work      Employer: [employer]   Tobacco Use     Smoking status: Never Smoker     Smokeless tobacco: Never Used   Vaping Use     Vaping Use: Never used   Substance and Sexual Activity     Alcohol use: Yes     Alcohol/week: 0.0 standard drinks     Comment: occasionally     Drug use: No     Sexual activity: Yes     Partners: Male     Birth control/protection: Condom   Other Topics Concern     Parent/sibling w/ CABG, MI or angioplasty before 65F 55M? Not Asked      Service No     Blood Transfusions No     Caffeine Concern No     Occupational Exposure No     Hobby Hazards No     Sleep Concern No     Stress Concern No     Weight Concern No     Special Diet No     Back Care Yes     Exercise Yes     Comment: moderate, at work     Bike Helmet Not Asked     Comment: she does not ride a bike     Seat Belt Yes     Self-Exams Yes   Social History Narrative     Not on file     Social Determinants of Health     Financial Resource Strain: Not on file   Food Insecurity: Not on file   Transportation Needs: Not on file   Physical Activity: Not on file   Stress: Not on file   Social Connections: Not on file   Intimate Partner Violence: Not on file   Housing Stability: Not on file       Outpatient Encounter Medications as of 5/24/2022   Medication Sig Dispense Refill     albuterol (PROAIR HFA/PROVENTIL HFA/VENTOLIN HFA) 108 (90 Base) MCG/ACT inhaler Inhale 2 puffs into the lungs every 6 hours       BORIC ACID EX        cetirizine (ZYRTEC) 10 MG tablet every 24 hours       metroNIDAZOLE (METROGEL) 0.75 % vaginal gel Place 1 applicator (5 g) vaginally daily 70 g 1     vitamin C-electrolytes (EMERGEN-C) 1000mg vitamin C super orange drink mix every 24 hours       fluticasone-salmeterol (ADVAIR-HFA) 115-21 MCG/ACT inhaler Inhale 2 puffs into the lungs 2 times daily (Patient not taking: No sig reported) 12 g 3     loratadine (CLARITIN) 10 MG tablet Take 10 mg by mouth daily   (Patient not taking: No sig reported)       nortriptyline (PAMELOR) 25 MG capsule Take 25 mg by mouth (Patient not taking: No sig reported)       SUMAtriptan (IMITREX) 100 MG tablet TAKE 1 TAB AT ONSET OF MIGRAINE. MAY REPEAT DOSE IN 2 HOURS IF NEEDED. DO NOT EXCEED 2 TABS/DAY, 4 TABS/WEEK OR 8 TABS/MONTH (Patient not taking: No sig reported)       No facility-administered encounter medications on file as of 5/24/2022.             O:   NAD, WDWN, Alert & Oriented, Mood & Affect wnl, Vitals stable   Here today alone   /71 (BP Location: Left arm, Patient Position: Sitting, Cuff Size: Adult Regular)   Pulse 69   SpO2 100%    General appearance normal   Vitals stable   Alert, oriented and in no acute distress     Brown macules on face   Stuck on papules and brown macules on trunk and ext   Red papules on trunk  Brown papules and macules with regular pigment network and borders on torso and extremities   Brown firm papule on left upper arm     Eyes: Conjunctivae/lids:Normal     ENT: Lips: normal    MSK:Normal    Pulm: Breathing Normal    Neuro/Psych: Orientation:Alert and Orientedx3 ; Mood/Affect:normal   A/P:  1. Seborrheic keratosis, lentigo, angioma, benign nevi, dermatofibroma    It was a pleasure speaking to Madeline Trent today.  BENIGN LESIONS DISCUSSED WITH PATIENT:  I discussed the specifics of tumor, prognosis, and genetics of benign lesions.  I explained that treatment of these lesions would be purely cosmetic and not medically neccessary.  I discussed with patient different removal options including excision, cautery and /or laser.      Nature and genetics of benign skin lesions dicussed with patient.  Signs and Symptoms of skin cancer discussed with patient.  ABCDEs of melanoma reviewed with patient.  Patient encouraged to perform monthly skin exams.  UV precautions reviewed with patient.  Risks of non-melanoma skin cancer discussed with patient   Return to clinic in one year or sooner

## 2022-05-24 NOTE — LETTER
5/24/2022         RE: Madeline Trent  [address] [place] [address]        Dear Colleague,    Thank you for referring your patient, Madeline Trent, to the [facility]. Please see a copy of my visit note below.    Madeline Trent is an extremely pleasant 47 year old year old female patient here today for skin check. 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 She does wear sunscreen and reapplies often. No painful or bleeding skin lesions.   Patient has no other skin complaints today.  Remainder of the HPI, Meds, PMH, Allergies, FH, and SH was reviewed in chart.    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  no evidence of malignancy.     PE TUBES          Family History   Problem Relation Age of Onset     Lipids Father      Eye Disorder Maternal Grandfather         cataract     Lipids Maternal Grandfather      Respiratory Maternal Grandfather      Cancer Paternal Grandmother         pantriatic     Arthritis Paternal Grandmother      Cancer Paternal Grandfather         skin     Respiratory Paternal Grandfather      Arthritis Paternal Grandfather      Hypertension Brother      Alcohol/Drug Brother         addict to pain killers     Cancer  Maternal Grandmother        Social History     Socioeconomic History     Marital status: Significant other     Spouse name: partner- Sarabjit     Number of children: 2     Years of education: Not on file     Highest education level: Not on file   Occupational History     Occupation: construction work      Employer: [employer]   Tobacco Use     Smoking status: Never Smoker     Smokeless tobacco: Never Used   Vaping Use     Vaping Use: Never used   Substance and Sexual Activity     Alcohol use: Yes     Alcohol/week: 0.0 standard drinks     Comment: occasionally     Drug use: No     Sexual activity: Yes     Partners: Male     Birth control/protection: Condom   Other Topics Concern     Parent/sibling w/ CABG, MI or angioplasty before 65F 55M? Not Asked      Service No     Blood Transfusions No     Caffeine Concern No     Occupational Exposure No     Hobby Hazards No     Sleep Concern No     Stress Concern No     Weight Concern No     Special Diet No     Back Care Yes     Exercise Yes     Comment: moderate, at work     Bike Helmet Not Asked     Comment: she does not ride a bike     Seat Belt Yes     Self-Exams Yes   Social History Narrative     Not on file     Social Determinants of Health     Financial Resource Strain: Not on file   Food Insecurity: Not on file   Transportation Needs: Not on file   Physical Activity: Not on file   Stress: Not on file   Social Connections: Not on file   Intimate Partner Violence: Not on file   Housing Stability: Not on file       Outpatient Encounter Medications as of 5/24/2022   Medication Sig Dispense Refill     albuterol (PROAIR HFA/PROVENTIL HFA/VENTOLIN HFA) 108 (90 Base) MCG/ACT inhaler Inhale 2 puffs into the lungs every 6 hours       BORIC ACID EX        cetirizine (ZYRTEC) 10 MG tablet every 24 hours       metroNIDAZOLE (METROGEL) 0.75 % vaginal gel Place 1 applicator (5 g) vaginally daily 70 g 1     vitamin C-electrolytes (EMERGEN-C) 1000mg vitamin C  super orange drink mix every 24 hours       fluticasone-salmeterol (ADVAIR-HFA) 115-21 MCG/ACT inhaler Inhale 2 puffs into the lungs 2 times daily (Patient not taking: No sig reported) 12 g 3     loratadine (CLARITIN) 10 MG tablet Take 10 mg by mouth daily  (Patient not taking: No sig reported)       nortriptyline (PAMELOR) 25 MG capsule Take 25 mg by mouth (Patient not taking: No sig reported)       SUMAtriptan (IMITREX) 100 MG tablet TAKE 1 TAB AT ONSET OF MIGRAINE. MAY REPEAT DOSE IN 2 HOURS IF NEEDED. DO NOT EXCEED 2 TABS/DAY, 4 TABS/WEEK OR 8 TABS/MONTH (Patient not taking: No sig reported)       No facility-administered encounter medications on file as of 5/24/2022.             O:   NAD, WDWN, Alert & Oriented, Mood & Affect wnl, Vitals stable   Here today alone   /71 (BP Location: Left arm, Patient Position: Sitting, Cuff Size: Adult Regular)   Pulse 69   SpO2 100%    General appearance normal   Vitals stable   Alert, oriented and in no acute distress     Brown macules on face   Stuck on papules and brown macules on trunk and ext   Red papules on trunk  Brown papules and macules with regular pigment network and borders on torso and extremities   Brown firm papule on left upper arm     Eyes: Conjunctivae/lids:Normal     ENT: Lips: normal    MSK:Normal    Pulm: Breathing Normal    Neuro/Psych: Orientation:Alert and Orientedx3 ; Mood/Affect:normal   A/P:  1. Seborrheic keratosis, lentigo, angioma, benign nevi, dermatofibroma    It was a pleasure speaking to Madeline Trent today.  BENIGN LESIONS DISCUSSED WITH PATIENT:  I discussed the specifics of tumor, prognosis, and genetics of benign lesions.  I explained that treatment of these lesions would be purely cosmetic and not medically neccessary.  I discussed with patient different removal options including excision, cautery and /or laser.      Nature and genetics of benign skin lesions dicussed with patient.  Signs and Symptoms of skin cancer  discussed with patient.  ABCDEs of melanoma reviewed with patient.  Patient encouraged to perform monthly skin exams.  UV precautions reviewed with patient.  Risks of non-melanoma skin cancer discussed with patient   Return to clinic in one year or sooner        Again, thank you for allowing me to participate in the care of your patient.        Sincerely,        Nedra Dooley PA-C

## 2022-10-29 ENCOUNTER — HEALTH MAINTENANCE LETTER (OUTPATIENT)
Age: 47
End: 2022-10-29

## 2023-01-24 ENCOUNTER — OFFICE VISIT (OUTPATIENT)
Dept: OBGYN | Facility: CLINIC | Age: 48
End: 2023-01-24
Payer: COMMERCIAL

## 2023-01-24 VITALS
WEIGHT: 156.4 LBS | BODY MASS INDEX: 27.49 KG/M2 | SYSTOLIC BLOOD PRESSURE: 130 MMHG | DIASTOLIC BLOOD PRESSURE: 78 MMHG | OXYGEN SATURATION: 99 % | HEART RATE: 78 BPM

## 2023-01-24 DIAGNOSIS — N84.1 POLYP, CERVIX: ICD-10-CM

## 2023-01-24 DIAGNOSIS — Z13.1 SCREENING FOR DIABETES MELLITUS: ICD-10-CM

## 2023-01-24 DIAGNOSIS — R92.30 DENSE BREAST TISSUE ON MAMMOGRAM: ICD-10-CM

## 2023-01-24 DIAGNOSIS — E78.5 DYSLIPIDEMIA: ICD-10-CM

## 2023-01-24 DIAGNOSIS — Z12.31 VISIT FOR SCREENING MAMMOGRAM: ICD-10-CM

## 2023-01-24 DIAGNOSIS — R87.810 CERVICAL HIGH RISK HPV (HUMAN PAPILLOMAVIRUS) TEST POSITIVE: ICD-10-CM

## 2023-01-24 DIAGNOSIS — Z01.411 ENCOUNTER FOR GYNECOLOGICAL EXAMINATION WITH ABNORMAL FINDING: Primary | ICD-10-CM

## 2023-01-24 PROCEDURE — 99212 OFFICE O/P EST SF 10 MIN: CPT | Mod: 25 | Performed by: OBSTETRICS & GYNECOLOGY

## 2023-01-24 PROCEDURE — 57500 BIOPSY OF CERVIX: CPT | Performed by: OBSTETRICS & GYNECOLOGY

## 2023-01-24 PROCEDURE — 99396 PREV VISIT EST AGE 40-64: CPT | Mod: 25 | Performed by: OBSTETRICS & GYNECOLOGY

## 2023-01-24 PROCEDURE — 88175 CYTOPATH C/V AUTO FLUID REDO: CPT | Performed by: OBSTETRICS & GYNECOLOGY

## 2023-01-24 PROCEDURE — 87624 HPV HI-RISK TYP POOLED RSLT: CPT | Performed by: OBSTETRICS & GYNECOLOGY

## 2023-01-24 PROCEDURE — 88305 TISSUE EXAM BY PATHOLOGIST: CPT | Performed by: PATHOLOGY

## 2023-01-24 NOTE — PROGRESS NOTES
Madeline Trent is a 48 year old female , who presents for annual exam.   No unusual bleeding, no incontinence, or unusual discharge.   She is currently using condoms and abstinence for contraception.  She does not have any apparent contraindications to use.  She has not had any apparent complications with it's use.  Last cholesterol: Recent Labs   Lab Test 19  1340 16  1001   CHOL 237* 222*   HDL 74 53   * 152*   TRIG 61 85     Past Medical History:   Diagnosis Date     Abnormal Pap smear of cervix 2015    see problem list     Asthma      Benign tumor of fallopian tube and uterine ligaments      Cervical high risk HPV (human papillomavirus) test positive 2018, ,      Genital herpes        Past Surgical History:   Procedure Laterality Date     ADENOIDECTOMY       COLONOSCOPY N/A 2019    Procedure: COMBINED COLONOSCOPY, SINGLE OR MULTIPLE BIOPSY/POLYPECTOMY BY BIOPSY;  Surgeon: Narinder Parker, ;  Location: [facility]     COLPOSCOPY, BIOPSY, COMBINED       HC EXCIS PRIMARY GANGLION WRIST       HC REMOVAL OF OVARIAN CYST(S)      right adnexal cyst:  benign focally papillary serous cystadenoma.  no evidence of malignancy.     PE TUBES         OB History    Para Term  AB Living   2 2 2 0 0 2   SAB IAB Ectopic Multiple Live Births   0 0 0 0 2      # Outcome Date GA Lbr Tyron/2nd Weight Sex Delivery Anes PTL Lv   2 Term 10/10/15   3.827 kg (8 lb 7 oz) F Vag-Spont   JEM      Name: Korin Purvis   1 Term 13 40w2d  3.402 kg (7 lb 8 oz) F    JEM      Name: Essence       Gyn History:  Gynecological History         Patient's last menstrual period was 2023 (exact date).     STD Herpes,  HPV/No PID/She has used an IUD      history of abnormal pap smear:  yes  Last pap:   Lab Results   Component Value Date    PAP NIL 2020           Current Outpatient Medications   Medication Sig Dispense Refill     albuterol (PROAIR HFA/PROVENTIL  HFA/VENTOLIN HFA) 108 (90 Base) MCG/ACT inhaler Inhale 2 puffs into the lungs every 6 hours       cetirizine (ZYRTEC) 10 MG tablet every 24 hours       vitamin C-electrolytes (EMERGEN-C) 1000mg vitamin C super orange drink mix every 24 hours       BORIC ACID EX  (Patient not taking: Reported on 1/24/2023)       fluticasone-salmeterol (ADVAIR-HFA) 115-21 MCG/ACT inhaler Inhale 2 puffs into the lungs 2 times daily (Patient not taking: Reported on 3/29/2022) 12 g 3     loratadine (CLARITIN) 10 MG tablet Take 10 mg by mouth daily  (Patient not taking: Reported on 3/29/2022)       metroNIDAZOLE (METROGEL) 0.75 % vaginal gel Place 1 applicator (5 g) vaginally daily (Patient not taking: Reported on 1/24/2023) 70 g 1     nortriptyline (PAMELOR) 25 MG capsule Take 25 mg by mouth (Patient not taking: Reported on 3/29/2022)       SUMAtriptan (IMITREX) 100 MG tablet TAKE 1 TAB AT ONSET OF MIGRAINE. MAY REPEAT DOSE IN 2 HOURS IF NEEDED. DO NOT EXCEED 2 TABS/DAY, 4 TABS/WEEK OR 8 TABS/MONTH (Patient not taking: No sig reported)         Allergies   Allergen Reactions     Sulfa Drugs        Social History     Socioeconomic History     Marital status: Significant other     Spouse name: partner- Sarabjit     Number of children: 2     Years of education: Not on file     Highest education level: Not on file   Occupational History     Occupation: construction work      Employer: [employer]   Tobacco Use     Smoking status: Never     Smokeless tobacco: Never   Vaping Use     Vaping Use: Never used   Substance and Sexual Activity     Alcohol use: Yes     Alcohol/week: 0.0 standard drinks     Comment: occasionally     Drug use: No     Sexual activity: Yes     Partners: Male     Birth control/protection: Condom   Other Topics Concern     Parent/sibling w/ CABG, MI or angioplasty before 65F 55M? Not Asked      Service No     Blood Transfusions No     Caffeine Concern No     Occupational Exposure No     Hobby Hazards  No     Sleep Concern No     Stress Concern No     Weight Concern No     Special Diet No     Back Care Yes     Exercise Yes     Comment: moderate, at work     Bike Helmet Not Asked     Comment: she does not ride a bike     Seat Belt Yes     Self-Exams Yes   Social History Narrative     Not on file     Social Determinants of Health     Financial Resource Strain: Not on file   Food Insecurity: Not on file   Transportation Needs: Not on file   Physical Activity: Not on file   Stress: Not on file   Social Connections: Not on file   Intimate Partner Violence: Not on file   Housing Stability: Not on file       Family History   Problem Relation Age of Onset     Lipids Father      Eye Disorder Maternal Grandfather         cataract     Lipids Maternal Grandfather      Respiratory Maternal Grandfather      Cancer Paternal Grandmother         pantriatic     Arthritis Paternal Grandmother      Cancer Paternal Grandfather         skin     Respiratory Paternal Grandfather      Arthritis Paternal Grandfather      Hypertension Brother      Alcohol/Drug Brother         addict to pain killers     Cancer Maternal Grandmother          ROS:  All negative except as above.      EXAM:  /78 (BP Location: Right arm, Cuff Size: Adult Regular)   Pulse 78   Wt 70.9 kg (156 lb 6.4 oz)   LMP 01/21/2023 (Exact Date)   SpO2 99%   BMI 27.49 kg/m    General:  WNWD female, NAD  Alert  Oriented x 3  Gait:  Normal  Skin:  Normal skin turgor  Neurologic:  CN grossly intact, good sensation.    HEENT:  NC/AT, EOMI  Neck:  No masses palpated, symmetrical, carotids +2/4, no bruits heard  Heart:  RRR  Lungs:  Clear   Breasts:  Symmetrical, no dimpling noted, no masses palpated, no discharge expressed  Abdomen:  Non-tender, non-distended.  Vulva: No external lesions, normal hair distribution, no adenopathy  BUS:  Normal, no masses noted  Urethra:  No hypermobility noted  Urethral meatus:  No masses noted  Vagina: Moist, pink, no abnormal discharge,  well rugated, no lesions  Cervix: Smooth, pink, cervical polyp seen   Uterus: Normal size, anteverted, non-tender, mobile  Ovaries: No mass, non-tender, mobile  Perianal:  No masses noted.   Extremities:  No clubbing, cyanosis, or edema      ASSESSMENT/PLAN   Annual examination   Simple ovarian cyst on ultrasound (through [facility] in [place]).  The results are reviewed with patient, who pulled the report up from the hospital on her cell phone. The cyst measured 1.0 x 0.9 x 0.6 cm.  No follow up is suggested based on the Radiology consensus guidelines.    Management Recommendation:  Simple Cyst:  Reproductive Age Patient     < 5 cm: No follow up.     5-7 cm: Yearly US.     > 7 cm: MRI.  REFERENCE:  Management of Asymptomatic Ovarian and Other Adnexal Cysts Imaged at  US: Society of Radiologists in Ultrasound Consensus Conference  Statement. Radiology September 2010; [id number].  Simple Adnexal Cysts: SRU Consensus Conference Update on Follow-up and Reporting. Radiology September 2019. [id number].    Low fat diet, weight bearing exercises and self breast exams on a monthly basis have been recommended.  I have discussed with patient the risks, benefits, medications, treatment options and modalities.   I have instructed the patient to call or schedule a follow-up appointment if any problems.    Lion Cuevas MD        PROCEDURE NOTE  Madeline and I reviewed the cervical polyp and the recommendations to have it removed.  She consented to the procedure.   The cervical polyp was grasped with the Allis clamp and the polyp was removed with clockwise torsion.  Hemostasis noted.   She tolerated the procedure well.     Lion Cuevas MD

## 2023-01-27 LAB
BKR LAB AP GYN ADEQUACY: NORMAL
BKR LAB AP GYN INTERPRETATION: NORMAL
BKR LAB AP GYN OTHER FINDINGS: NORMAL
BKR LAB AP HPV REFLEX: NORMAL
BKR LAB AP LMP: NORMAL
BKR LAB AP PREVIOUS ABNL DX: NORMAL
BKR LAB AP PREVIOUS ABNORMAL: NORMAL
PATH REPORT.COMMENTS IMP SPEC: NORMAL
PATH REPORT.COMMENTS IMP SPEC: NORMAL
PATH REPORT.RELEVANT HX SPEC: NORMAL

## 2023-02-01 ENCOUNTER — PATIENT OUTREACH (OUTPATIENT)
Dept: OBGYN | Facility: CLINIC | Age: 48
End: 2023-02-01
Payer: COMMERCIAL

## 2023-02-08 NOTE — ANESTHESIA CARE TRANSFER NOTE
Patient: Madeline Trent    Procedure(s):  COMBINED COLONOSCOPY, SINGLE OR MULTIPLE BIOPSY/POLYPECTOMY BY BIOPSY    Diagnosis: diarrhea   diagnostic  Diagnosis Additional Information: No value filed.    Anesthesia Type:   General     Note:  Airway :Nasal Cannula  Patient transferred to:Phase II  Handoff Report: Identifed the Patient, Identified the Reponsible Provider, Reviewed the pertinent medical history, Discussed the surgical course, Reviewed Intra-OP anesthesia mangement and issues during anesthesia, Set expectations for post-procedure period and Allowed opportunity for questions and acknowledgement of understanding      Vitals: (Last set prior to Anesthesia Care Transfer)    CRNA VITALS  2/27/2019 1014 - 2/27/2019 1044      2/27/2019             Pulse:  87    SpO2:  99 %                Electronically Signed By: Daron Montoya CRNA, APRN CRNA  February 27, 2019  10:44 AM   5 5

## 2023-06-25 ENCOUNTER — HEALTH MAINTENANCE LETTER (OUTPATIENT)
Age: 48
End: 2023-06-25

## 2024-01-05 ENCOUNTER — PATIENT OUTREACH (OUTPATIENT)
Dept: OBGYN | Facility: CLINIC | Age: 49
End: 2024-01-05
Payer: COMMERCIAL

## 2024-02-29 ENCOUNTER — OFFICE VISIT (OUTPATIENT)
Dept: OBGYN | Facility: CLINIC | Age: 49
End: 2024-02-29
Payer: COMMERCIAL

## 2024-02-29 VITALS
HEART RATE: 74 BPM | DIASTOLIC BLOOD PRESSURE: 82 MMHG | BODY MASS INDEX: 27.61 KG/M2 | SYSTOLIC BLOOD PRESSURE: 124 MMHG | WEIGHT: 157.1 LBS | OXYGEN SATURATION: 100 %

## 2024-02-29 DIAGNOSIS — R92.333 HETEROGENEOUSLY DENSE TISSUE OF BOTH BREASTS ON MAMMOGRAPHY: ICD-10-CM

## 2024-02-29 DIAGNOSIS — Z01.419 ENCOUNTER FOR GYNECOLOGICAL EXAMINATION WITHOUT ABNORMAL FINDING: Primary | ICD-10-CM

## 2024-02-29 DIAGNOSIS — Z13.220 SCREENING CHOLESTEROL LEVEL: ICD-10-CM

## 2024-02-29 DIAGNOSIS — Z11.3 SCREEN FOR STD (SEXUALLY TRANSMITTED DISEASE): ICD-10-CM

## 2024-02-29 DIAGNOSIS — Z12.31 VISIT FOR SCREENING MAMMOGRAM: ICD-10-CM

## 2024-02-29 DIAGNOSIS — Z13.1 SCREENING FOR DIABETES MELLITUS: ICD-10-CM

## 2024-02-29 DIAGNOSIS — B97.7 HIGH RISK HUMAN PAPILLOMA VIRUS (HPV) INFECTION OF CERVIX: ICD-10-CM

## 2024-02-29 DIAGNOSIS — N72 HIGH RISK HUMAN PAPILLOMA VIRUS (HPV) INFECTION OF CERVIX: ICD-10-CM

## 2024-02-29 LAB
C TRACH DNA SPEC QL NAA+PROBE: NEGATIVE
CLUE CELLS: ABNORMAL
N GONORRHOEA DNA SPEC QL NAA+PROBE: NEGATIVE
TRICHOMONAS, WET PREP: ABNORMAL
WBC'S/HIGH POWER FIELD, WET PREP: ABNORMAL
YEAST, WET PREP: ABNORMAL

## 2024-02-29 PROCEDURE — 87624 HPV HI-RISK TYP POOLED RSLT: CPT | Performed by: OBSTETRICS & GYNECOLOGY

## 2024-02-29 PROCEDURE — 88175 CYTOPATH C/V AUTO FLUID REDO: CPT | Performed by: OBSTETRICS & GYNECOLOGY

## 2024-02-29 PROCEDURE — 87591 N.GONORRHOEAE DNA AMP PROB: CPT | Performed by: OBSTETRICS & GYNECOLOGY

## 2024-02-29 PROCEDURE — 87210 SMEAR WET MOUNT SALINE/INK: CPT | Performed by: OBSTETRICS & GYNECOLOGY

## 2024-02-29 PROCEDURE — 87491 CHLMYD TRACH DNA AMP PROBE: CPT | Performed by: OBSTETRICS & GYNECOLOGY

## 2024-02-29 PROCEDURE — 99396 PREV VISIT EST AGE 40-64: CPT | Performed by: OBSTETRICS & GYNECOLOGY

## 2024-02-29 NOTE — PROGRESS NOTES
Madeline Trent is a 49 year old female , who presents for annual exam.   No unusual bleeding, no incontinence, or unusual discharge.     Last cholesterol:   Recent Labs   Lab Test 19  1340 16  1001   CHOL 237* 222*   HDL 74 53   * 152*   TRIG 61 85     Past Medical History:   Diagnosis Date    Abnormal Pap smear of cervix 2015    see problem list    Asthma     Benign tumor of fallopian tube and uterine ligaments     Cervical high risk HPV (human papillomavirus) test positive 2018, ,     Genital herpes        Past Surgical History:   Procedure Laterality Date    ADENOIDECTOMY      COLONOSCOPY N/A 2019    Procedure: COMBINED COLONOSCOPY, SINGLE OR MULTIPLE BIOPSY/POLYPECTOMY BY BIOPSY;  Surgeon: Narinder Parker DO;  Location: [facility]    COLPOSCOPY, BIOPSY, COMBINED      HC EXCIS PRIMARY GANGLION WRIST      HC REMOVAL OF OVARIAN CYST(S)      right adnexal cyst:  benign focally papillary serous cystadenoma.  no evidence of malignancy.    PE TUBES         OB History    Para Term  AB Living   2 2 2 0 0 2   SAB IAB Ectopic Multiple Live Births   0 0 0 0 2      # Outcome Date GA Lbr Tyron/2nd Weight Sex Delivery Anes PTL Lv   2 Term 10/10/15   3.827 kg (8 lb 7 oz) F Vag-Spont   JEM      Name: Korin Purvis   1 Term 13 40w2d  3.402 kg (7 lb 8 oz) F    JEM      Name: Essence       Gyn History:  Gynecological History            Patient's last menstrual period was 2024 (exact date).     STD HPV, HSV type 2/no PID/she IUD     history of abnormal pap smear: yes, see pap smear history in problem list   Last pap:   Lab Results   Component Value Date    PAP NIL 2020           Current Outpatient Medications   Medication Sig Dispense Refill    albuterol (PROAIR HFA/PROVENTIL HFA/VENTOLIN HFA) 108 (90 Base) MCG/ACT inhaler Inhale 2 puffs into the lungs every 6 hours      cetirizine (ZYRTEC) 10 MG tablet every 24 hours      vitamin  C-electrolytes (EMERGEN-C) 1000mg vitamin C super orange drink mix every 24 hours      BORIC ACID EX  (Patient not taking: Reported on 1/24/2023)      fluticasone-salmeterol (ADVAIR-HFA) 115-21 MCG/ACT inhaler Inhale 2 puffs into the lungs 2 times daily (Patient not taking: Reported on 3/29/2022) 12 g 3    loratadine (CLARITIN) 10 MG tablet Take 10 mg by mouth daily  (Patient not taking: Reported on 3/29/2022)      metroNIDAZOLE (METROGEL) 0.75 % vaginal gel Place 1 applicator (5 g) vaginally daily (Patient not taking: Reported on 1/24/2023) 70 g 1    nortriptyline (PAMELOR) 25 MG capsule Take 25 mg by mouth (Patient not taking: Reported on 3/29/2022)      SUMAtriptan (IMITREX) 100 MG tablet TAKE 1 TAB AT ONSET OF MIGRAINE. MAY REPEAT DOSE IN 2 HOURS IF NEEDED. DO NOT EXCEED 2 TABS/DAY, 4 TABS/WEEK OR 8 TABS/MONTH (Patient not taking: No sig reported)         Allergies   Allergen Reactions    Sulfa Antibiotics        Social History     Socioeconomic History    Marital status:      Spouse name: Not on file    Number of children: 2    Years of education: Not on file    Highest education level: Not on file   Occupational History    Occupation: construction work      Employer: [employer]   Tobacco Use    Smoking status: Never    Smokeless tobacco: Never   Vaping Use    Vaping Use: Never used   Substance and Sexual Activity    Alcohol use: Yes     Alcohol/week: 0.0 standard drinks of alcohol     Comment: occasionally    Drug use: No    Sexual activity: Yes     Partners: Male     Birth control/protection: Condom   Other Topics Concern    Parent/sibling w/ CABG, MI or angioplasty before 65F 55M? Not Asked     Service No    Blood Transfusions No    Caffeine Concern No    Occupational Exposure No    Hobby Hazards No    Sleep Concern No    Stress Concern No    Weight Concern No    Special Diet No    Back Care Yes    Exercise Yes     Comment: moderate, at work    Bike Helmet Not Asked      Comment: she does not ride a bike    Seat Belt Yes    Self-Exams Yes   Social History Narrative    Not on file     Social Determinants of Health     Financial Resource Strain: Not on file   Food Insecurity: Not on file   Transportation Needs: Not on file   Physical Activity: Not on file   Stress: Not on file   Social Connections: Not on file   Interpersonal Safety: Not on file   Housing Stability: Not on file       Family History   Problem Relation Age of Onset    Lipids Father     Eye Disorder Maternal Grandfather         cataract    Lipids Maternal Grandfather     Respiratory Maternal Grandfather     Cancer Paternal Grandmother         pantriatic    Arthritis Paternal Grandmother     Cancer Paternal Grandfather         skin    Respiratory Paternal Grandfather     Arthritis Paternal Grandfather     Hypertension Brother     Alcohol/Drug Brother         addict to pain killers    Cancer Maternal Grandmother          ROS:  All negative except as above.      EXAM:  /82 (BP Location: Right arm, Cuff Size: Adult Regular)   Pulse 74   Wt 71.3 kg (157 lb 1.6 oz)   LMP 02/21/2024 (Exact Date)   SpO2 100%   BMI 27.61 kg/m    General:  WNWD female, NAD  Alert  Oriented x 3  Gait:  Normal  Skin:  Normal skin turgor  Neurologic:  CN grossly intact, good sensation.    HEENT:  NC/AT, EOMI  Neck:  No masses palpated, symmetrical, carotids +2/4, no bruits heard  Heart:  RRR  Lungs:  Clear   Breasts:  Symmetrical, no dimpling noted, no masses palpated, no discharge expressed  Abdomen:  Non-tender, non-distended.  Vulva: No external lesions, normal hair distribution, no adenopathy  BUS:  Normal, no masses noted  Urethra:  No hypermobility noted  Urethral meatus:  No masses noted  Vagina: Moist, pink, no abnormal discharge, well rugated, no lesions  Cervix: Smooth, pink, no visible lesions, no CMT  Uterus: Normal size, anteverted, non-tender, mobile  Ovaries: No mass, non-tender, mobile  Perianal:  No masses noted.     Extremities:  No clubbing, cyanosis, or edema      ASSESSMENT/PLAN   Annual examination   Pap smear for the HR HPV of cervix   Fasting labs ordered.   She desires to have STD screen:  wet prep and GC and Chlamydia  Low fat diet, weight bearing exercises and self breast exams on a monthly basis have been recommended.  I have discussed with patient the risks, benefits, medications, treatment options and modalities.   I have instructed the patient to call or schedule a follow-up appointment if any problems.

## 2024-03-06 LAB
HUMAN PAPILLOMA VIRUS 16 DNA: NEGATIVE
HUMAN PAPILLOMA VIRUS 18 DNA: NEGATIVE
HUMAN PAPILLOMA VIRUS FINAL DIAGNOSIS: NORMAL
HUMAN PAPILLOMA VIRUS OTHER HR: NEGATIVE

## 2024-03-07 ENCOUNTER — PATIENT OUTREACH (OUTPATIENT)
Dept: OBGYN | Facility: CLINIC | Age: 49
End: 2024-03-07
Payer: COMMERCIAL

## 2024-03-13 ENCOUNTER — LAB (OUTPATIENT)
Dept: LAB | Facility: CLINIC | Age: 49
End: 2024-03-13
Payer: COMMERCIAL

## 2024-03-13 DIAGNOSIS — Z13.220 SCREENING CHOLESTEROL LEVEL: ICD-10-CM

## 2024-03-13 DIAGNOSIS — Z13.1 SCREENING FOR DIABETES MELLITUS: ICD-10-CM

## 2024-03-13 LAB
CHOLEST SERPL-MCNC: 243 MG/DL
FASTING STATUS PATIENT QL REPORTED: YES
FASTING STATUS PATIENT QL REPORTED: YES
GLUCOSE SERPL-MCNC: 95 MG/DL (ref 70–99)
HDLC SERPL-MCNC: 71 MG/DL
LDLC SERPL CALC-MCNC: 156 MG/DL
NONHDLC SERPL-MCNC: 172 MG/DL
TRIGL SERPL-MCNC: 79 MG/DL

## 2024-03-13 PROCEDURE — 36415 COLL VENOUS BLD VENIPUNCTURE: CPT

## 2024-03-13 PROCEDURE — 80061 LIPID PANEL: CPT

## 2024-03-13 PROCEDURE — 82947 ASSAY GLUCOSE BLOOD QUANT: CPT

## 2024-03-15 ENCOUNTER — MYC MEDICAL ADVICE (OUTPATIENT)
Dept: OBGYN | Facility: CLINIC | Age: 49
End: 2024-03-15
Payer: COMMERCIAL

## 2024-03-19 NOTE — TELEPHONE ENCOUNTER
Pt asking for interpretation of 3/13/2024 lab results- RN routing to provider to advise.    Katarzyna Dobbs RN on 3/19/2024 at 1:43 PM

## 2024-03-22 NOTE — TELEPHONE ENCOUNTER
I was out of the office all last week and into this week.   I sent a [employer] message to her, but it appears she has not read it.

## 2024-04-16 ENCOUNTER — HOSPITAL ENCOUNTER (OUTPATIENT)
Dept: MAMMOGRAPHY | Facility: CLINIC | Age: 49
Discharge: HOME | End: 2024-04-16
Attending: OBSTETRICS & GYNECOLOGY | Admitting: OBSTETRICS & GYNECOLOGY
Payer: COMMERCIAL

## 2024-04-16 DIAGNOSIS — R92.333 HETEROGENEOUSLY DENSE TISSUE OF BOTH BREASTS ON MAMMOGRAPHY: ICD-10-CM

## 2024-04-16 DIAGNOSIS — Z12.31 VISIT FOR SCREENING MAMMOGRAM: ICD-10-CM

## 2024-04-16 PROCEDURE — 77063 BREAST TOMOSYNTHESIS BI: CPT

## (undated) RX ORDER — LIDOCAINE HYDROCHLORIDE 10 MG/ML
INJECTION, SOLUTION EPIDURAL; INFILTRATION; INTRACAUDAL; PERINEURAL
Status: DISPENSED
Start: 2019-02-27

## (undated) RX ORDER — PROPOFOL 10 MG/ML
INJECTION, EMULSION INTRAVENOUS
Status: DISPENSED
Start: 2019-02-27